# Patient Record
Sex: MALE | Race: WHITE | NOT HISPANIC OR LATINO | Employment: OTHER | URBAN - METROPOLITAN AREA
[De-identification: names, ages, dates, MRNs, and addresses within clinical notes are randomized per-mention and may not be internally consistent; named-entity substitution may affect disease eponyms.]

---

## 2018-01-05 ENCOUNTER — HOSPITAL ENCOUNTER (EMERGENCY)
Facility: HOSPITAL | Age: 28
Discharge: HOME | End: 2018-01-05
Attending: EMERGENCY MEDICINE | Admitting: EMERGENCY MEDICINE

## 2018-01-05 ENCOUNTER — APPOINTMENT (OUTPATIENT)
Dept: ULTRASOUND IMAGING | Facility: HOSPITAL | Age: 28
End: 2018-01-05

## 2018-01-05 VITALS
DIASTOLIC BLOOD PRESSURE: 79 MMHG | RESPIRATION RATE: 16 BRPM | SYSTOLIC BLOOD PRESSURE: 129 MMHG | TEMPERATURE: 97.8 F | HEART RATE: 97 BPM | WEIGHT: 165 LBS | BODY MASS INDEX: 24.44 KG/M2 | OXYGEN SATURATION: 98 % | HEIGHT: 69 IN

## 2018-01-05 DIAGNOSIS — N45.1 EPIDIDYMITIS WITH NO ABSCESS: Primary | ICD-10-CM

## 2018-01-05 LAB
ANION GAP SERPL CALCULATED.3IONS-SCNC: 9.9 MMOL/L
BACTERIA UR QL AUTO: ABNORMAL /HPF
BASOPHILS # BLD AUTO: 0.03 10*3/MM3 (ref 0–0.2)
BASOPHILS NFR BLD AUTO: 0.1 % (ref 0–1.5)
BILIRUB UR QL STRIP: NEGATIVE
BUN BLD-MCNC: 7 MG/DL (ref 6–20)
BUN/CREAT SERPL: 8.6 (ref 7–25)
CALCIUM SPEC-SCNC: 8.9 MG/DL (ref 8.6–10.5)
CHLORIDE SERPL-SCNC: 98 MMOL/L (ref 98–107)
CLARITY UR: ABNORMAL
CO2 SERPL-SCNC: 29.1 MMOL/L (ref 22–29)
COLOR UR: YELLOW
CREAT BLD-MCNC: 0.81 MG/DL (ref 0.76–1.27)
DEPRECATED RDW RBC AUTO: 43.3 FL (ref 37–54)
EOSINOPHIL # BLD AUTO: 0.13 10*3/MM3 (ref 0–0.7)
EOSINOPHIL NFR BLD AUTO: 0.6 % (ref 0.3–6.2)
ERYTHROCYTE [DISTWIDTH] IN BLOOD BY AUTOMATED COUNT: 12.7 % (ref 11.5–14.5)
GFR SERPL CREATININE-BSD FRML MDRD: 113 ML/MIN/1.73
GLUCOSE BLD-MCNC: 97 MG/DL (ref 65–99)
GLUCOSE UR STRIP-MCNC: ABNORMAL MG/DL
HCT VFR BLD AUTO: 41.7 % (ref 40.4–52.2)
HGB BLD-MCNC: 13.8 G/DL (ref 13.7–17.6)
HGB UR QL STRIP.AUTO: ABNORMAL
HYALINE CASTS UR QL AUTO: ABNORMAL /LPF
IMM GRANULOCYTES # BLD: 0.07 10*3/MM3 (ref 0–0.03)
IMM GRANULOCYTES NFR BLD: 0.3 % (ref 0–0.5)
KETONES UR QL STRIP: NEGATIVE
LEUKOCYTE ESTERASE UR QL STRIP.AUTO: ABNORMAL
LYMPHOCYTES # BLD AUTO: 2.59 10*3/MM3 (ref 0.9–4.8)
LYMPHOCYTES NFR BLD AUTO: 11.4 % (ref 19.6–45.3)
MCH RBC QN AUTO: 30.8 PG (ref 27–32.7)
MCHC RBC AUTO-ENTMCNC: 33.1 G/DL (ref 32.6–36.4)
MCV RBC AUTO: 93.1 FL (ref 79.8–96.2)
MONOCYTES # BLD AUTO: 1.59 10*3/MM3 (ref 0.2–1.2)
MONOCYTES NFR BLD AUTO: 7 % (ref 5–12)
NEUTROPHILS # BLD AUTO: 18.28 10*3/MM3 (ref 1.9–8.1)
NEUTROPHILS NFR BLD AUTO: 80.6 % (ref 42.7–76)
NITRITE UR QL STRIP: NEGATIVE
PH UR STRIP.AUTO: 6.5 [PH] (ref 5–8)
PLATELET # BLD AUTO: 284 10*3/MM3 (ref 140–500)
PMV BLD AUTO: 10.7 FL (ref 6–12)
POTASSIUM BLD-SCNC: 3.7 MMOL/L (ref 3.5–5.2)
PROT UR QL STRIP: ABNORMAL
RBC # BLD AUTO: 4.48 10*6/MM3 (ref 4.6–6)
RBC # UR: ABNORMAL /HPF
REF LAB TEST METHOD: ABNORMAL
SODIUM BLD-SCNC: 137 MMOL/L (ref 136–145)
SP GR UR STRIP: 1.03 (ref 1–1.03)
SQUAMOUS #/AREA URNS HPF: ABNORMAL /HPF
UROBILINOGEN UR QL STRIP: ABNORMAL
WBC NRBC COR # BLD: 22.69 10*3/MM3 (ref 4.5–10.7)
WBC UR QL AUTO: ABNORMAL /HPF

## 2018-01-05 PROCEDURE — 87040 BLOOD CULTURE FOR BACTERIA: CPT | Performed by: EMERGENCY MEDICINE

## 2018-01-05 PROCEDURE — 99283 EMERGENCY DEPT VISIT LOW MDM: CPT

## 2018-01-05 PROCEDURE — 25010000003 CEFTRIAXONE PER 250 MG: Performed by: EMERGENCY MEDICINE

## 2018-01-05 PROCEDURE — 25010000002 HYDROMORPHONE PER 4 MG: Performed by: EMERGENCY MEDICINE

## 2018-01-05 PROCEDURE — 76870 US EXAM SCROTUM: CPT

## 2018-01-05 PROCEDURE — 93976 VASCULAR STUDY: CPT

## 2018-01-05 PROCEDURE — 80048 BASIC METABOLIC PNL TOTAL CA: CPT | Performed by: EMERGENCY MEDICINE

## 2018-01-05 PROCEDURE — 96365 THER/PROPH/DIAG IV INF INIT: CPT

## 2018-01-05 PROCEDURE — 25010000002 ONDANSETRON PER 1 MG: Performed by: EMERGENCY MEDICINE

## 2018-01-05 PROCEDURE — 85025 COMPLETE CBC W/AUTO DIFF WBC: CPT | Performed by: EMERGENCY MEDICINE

## 2018-01-05 PROCEDURE — 87086 URINE CULTURE/COLONY COUNT: CPT | Performed by: EMERGENCY MEDICINE

## 2018-01-05 PROCEDURE — 81001 URINALYSIS AUTO W/SCOPE: CPT | Performed by: EMERGENCY MEDICINE

## 2018-01-05 PROCEDURE — 96375 TX/PRO/DX INJ NEW DRUG ADDON: CPT

## 2018-01-05 RX ORDER — OXYCODONE HYDROCHLORIDE AND ACETAMINOPHEN 5; 325 MG/1; MG/1
1 TABLET ORAL EVERY 4 HOURS PRN
Qty: 10 TABLET | Refills: 0 | Status: ON HOLD | OUTPATIENT
Start: 2018-01-05 | End: 2022-09-09

## 2018-01-05 RX ORDER — CEFTRIAXONE SODIUM 2 G/50ML
2 INJECTION, SOLUTION INTRAVENOUS ONCE
Status: COMPLETED | OUTPATIENT
Start: 2018-01-05 | End: 2018-01-05

## 2018-01-05 RX ORDER — DOXYCYCLINE 100 MG/1
100 CAPSULE ORAL EVERY 12 HOURS SCHEDULED
Qty: 20 CAPSULE | Refills: 0 | Status: ON HOLD | OUTPATIENT
Start: 2018-01-05 | End: 2022-09-09

## 2018-01-05 RX ORDER — ONDANSETRON 2 MG/ML
4 INJECTION INTRAMUSCULAR; INTRAVENOUS ONCE
Status: COMPLETED | OUTPATIENT
Start: 2018-01-05 | End: 2018-01-05

## 2018-01-05 RX ORDER — SODIUM CHLORIDE 0.9 % (FLUSH) 0.9 %
10 SYRINGE (ML) INJECTION AS NEEDED
Status: DISCONTINUED | OUTPATIENT
Start: 2018-01-05 | End: 2018-01-05 | Stop reason: HOSPADM

## 2018-01-05 RX ORDER — HYDROMORPHONE HCL 110MG/55ML
0.5 PATIENT CONTROLLED ANALGESIA SYRINGE INTRAVENOUS ONCE
Status: COMPLETED | OUTPATIENT
Start: 2018-01-05 | End: 2018-01-05

## 2018-01-05 RX ADMIN — ONDANSETRON 4 MG: 2 INJECTION INTRAMUSCULAR; INTRAVENOUS at 14:58

## 2018-01-05 RX ADMIN — HYDROMORPHONE HYDROCHLORIDE 0.5 MG: 2 INJECTION INTRAMUSCULAR; INTRAVENOUS; SUBCUTANEOUS at 14:58

## 2018-01-05 RX ADMIN — CEFTRIAXONE SODIUM 2 G: 2 INJECTION, SOLUTION INTRAVENOUS at 15:44

## 2018-01-05 NOTE — ED NOTES
Pt c/o right testicular pain and swelling that started couple days ago. Denies trauma. Reports some blood in urine. Swelling and redness present to right testicle. No penile discharge     Mariajose Pavon RN  01/05/18 7027

## 2018-01-05 NOTE — ED PROVIDER NOTES
" EMERGENCY DEPARTMENT ENCOUNTER    CHIEF COMPLAINT  Chief Complaint: right testicular pain  History given by: patient  History limited by: nothing  Room Number: 51/51  PMD: No Known Provider      HPI:  Pt is a 27 y.o. male who presents complaining of right testicular pain that began three days ago and has continue to worsen and has become constant. He also complains of pain in his abdomen and back and states that he is nauseas. Pt also began having dysuria with the pain and then hematuria.     Duration:  Three days  Onset: gradual  Timing: constant  Location: testicle  Radiation: none  Quality: \"pain\"  Intensity/Severity: moderate  Progression: worsening  Associated Symptoms: pain in his abdomen and back, nausea, dysuria, hematuria  Aggravating Factors: none  Alleviating Factors: none  Previous Episodes: none  Treatment before arrival: none    PAST MEDICAL HISTORY  Active Ambulatory Problems     Diagnosis Date Noted   • No Active Ambulatory Problems     Resolved Ambulatory Problems     Diagnosis Date Noted   • No Resolved Ambulatory Problems     No Additional Past Medical History       PAST SURGICAL HISTORY  History reviewed. No pertinent surgical history.    FAMILY HISTORY  History reviewed. No pertinent family history.    SOCIAL HISTORY  Social History     Social History   • Marital status: Single     Spouse name: N/A   • Number of children: N/A   • Years of education: N/A     Occupational History   • Not on file.     Social History Main Topics   • Smoking status: Current Every Day Smoker     Types: Electronic Cigarette   • Smokeless tobacco: Not on file   • Alcohol use Yes      Comment: socially   • Drug use: Not on file   • Sexual activity: Not on file     Other Topics Concern   • Not on file     Social History Narrative   • No narrative on file       ALLERGIES  Benadryl [diphenhydramine hcl (sleep)]    REVIEW OF SYSTEMS  Review of Systems   Constitutional: Negative for activity change, appetite change and " fever.   HENT: Negative for congestion and sore throat.    Eyes: Negative.    Respiratory: Negative for cough and shortness of breath.    Cardiovascular: Negative for chest pain and leg swelling.   Gastrointestinal: Positive for abdominal pain and nausea. Negative for diarrhea and vomiting.   Endocrine: Negative.    Genitourinary: Positive for dysuria, hematuria and testicular pain (right). Negative for decreased urine volume.   Musculoskeletal: Positive for back pain. Negative for neck pain.   Skin: Negative for rash and wound.   Allergic/Immunologic: Negative.    Neurological: Negative for weakness, numbness and headaches.   Hematological: Negative.    Psychiatric/Behavioral: Negative.    All other systems reviewed and are negative.      PHYSICAL EXAM  ED Triage Vitals   Temp Heart Rate Resp BP SpO2   01/05/18 1220 01/05/18 1220 01/05/18 1220 01/05/18 1305 01/05/18 1220   97.8 °F (36.6 °C) 107 16 133/84 100 %      Temp src Heart Rate Source Patient Position BP Location FiO2 (%)   01/05/18 1220 -- -- -- --   Tympanic           Physical Exam   Constitutional: He is oriented to person, place, and time and well-developed, well-nourished, and in no distress.   HENT:   Head: Normocephalic and atraumatic.   Eyes: EOM are normal. Pupils are equal, round, and reactive to light.   Neck: Normal range of motion. Neck supple.   Cardiovascular: Normal rate, regular rhythm and normal heart sounds.    Pulmonary/Chest: Effort normal and breath sounds normal. No respiratory distress.   Abdominal: Soft. There is no tenderness. There is no rebound and no guarding.   Genitourinary: He exhibits testicular tenderness, scrotal tenderness and epididymal tenderness. No discharge found.   Genitourinary Comments: Pt's right testicle is swollen and tendernesss to palpation that extends into his inguinal canal. No hernia felt.   Musculoskeletal: Normal range of motion. He exhibits no edema.   Neurological: He is alert and oriented to person,  place, and time. He has normal sensation and normal strength.   Skin: Skin is warm and dry.   Psychiatric: Mood and affect normal.   Nursing note and vitals reviewed.      LAB RESULTS  Lab Results (last 24 hours)     Procedure Component Value Units Date/Time    CBC & Differential [[id number]] Collected:  01/05/18 1458    Specimen:  Blood Updated:  01/05/18 1511    Narrative:       The following orders were created for panel order CBC & Differential.  Procedure                               Abnormality         Status                     ---------                               -----------         ------                     CBC Auto Differential[[id number]]        Abnormal            Final result                 Please view results for these tests on the individual orders.    Basic Metabolic Panel [[id number]]  (Abnormal) Collected:  01/05/18 1458    Specimen:  Blood Updated:  01/05/18 1532     Glucose 97 mg/dL      BUN 7 mg/dL      Creatinine 0.81 mg/dL      Sodium 137 mmol/L      Potassium 3.7 mmol/L      Chloride 98 mmol/L      CO2 29.1 (H) mmol/L      Calcium 8.9 mg/dL      eGFR Non African Amer 113 mL/min/1.73      BUN/Creatinine Ratio 8.6     Anion Gap 9.9 mmol/L     Narrative:       GFR Normal >60  Chronic Kidney Disease <60  Kidney Failure <15    Urinalysis With / Culture If Indicated - Urine, Clean Catch [[id number]]  (Abnormal) Collected:  01/05/18 1458    Specimen:  Urine from Urine, Clean Catch Updated:  01/05/18 1515     Color, UA Yellow     Appearance, UA Cloudy (A)     pH, UA 6.5     Specific Gravity, UA 1.027     Glucose,  mg/dL (1+) (A)     Ketones, UA Negative     Bilirubin, UA Negative     Blood, UA Moderate (2+) (A)     Protein, UA 30 mg/dL (1+) (A)     Leuk Esterase, UA Moderate (2+) (A)     Nitrite, UA Negative     Urobilinogen, UA 1.0 E.U./dL    Chlamydia trachomatis, Neisseria gonorrhoeae, PCR - Urine, Urine, Clean Catch [[id number]] Collected:  01/05/18 1458    Specimen:  Urine from Urine,  Clean Catch Updated:  01/05/18 1506    CBC Auto Differential [[id number]]  (Abnormal) Collected:  01/05/18 1458    Specimen:  Blood Updated:  01/05/18 1511     WBC 22.69 (H) 10*3/mm3      RBC 4.48 (L) 10*6/mm3      Hemoglobin 13.8 g/dL      Hematocrit 41.7 %      MCV 93.1 fL      MCH 30.8 pg      MCHC 33.1 g/dL      RDW 12.7 %      RDW-SD 43.3 fl      MPV 10.7 fL      Platelets 284 10*3/mm3      Neutrophil % 80.6 (H) %      Lymphocyte % 11.4 (L) %      Monocyte % 7.0 %      Eosinophil % 0.6 %      Basophil % 0.1 %      Immature Grans % 0.3 %      Neutrophils, Absolute 18.28 (H) 10*3/mm3      Lymphocytes, Absolute 2.59 10*3/mm3      Monocytes, Absolute 1.59 (H) 10*3/mm3      Eosinophils, Absolute 0.13 10*3/mm3      Basophils, Absolute 0.03 10*3/mm3      Immature Grans, Absolute 0.07 (H) 10*3/mm3     Urinalysis, Microscopic Only - Urine, Clean Catch [[id number]]  (Abnormal) Collected:  01/05/18 1458    Specimen:  Urine from Urine, Clean Catch Updated:  01/05/18 1515     RBC, UA 21-30 (A) /HPF      WBC, UA Too Numerous to Count (A) /HPF      Bacteria, UA None Seen /HPF      Squamous Epithelial Cells, UA 0-2 /HPF      Hyaline Casts, UA 0-2 /LPF      Methodology Automated Microscopy    Urine Culture - Urine, Urine, Clean Catch [[id number]] Collected:  01/05/18 1458    Specimen:  Urine from Urine, Clean Catch Updated:  01/05/18 1512    Blood Culture - Blood, [[id number]] Collected:  01/05/18 1537    Specimen:  Blood from Arm, Left Updated:  01/05/18 1543    Blood Culture - Blood, [[id number]] Collected:  01/05/18 1537    Specimen:  Blood from Arm, Left Updated:  01/05/18 1543          I ordered the above labs and reviewed the results    RADIOLOGY  US Testicular or Ovarian Vascular Limited   Final Result   Hyperemia and enlargement of the right epididymis consistent   with epididymitis. No evidence for abscess or drainable collection or   orchitis. Recommend follow-up exam if patient symptoms persist or   worsen.       This  report was finalized on 1/5/2018 2:32 PM by Dr. Viral Sepulveda MD.          US Scrotum & Testicles    (Results Pending)        I ordered the above noted radiological studies. Interpreted by radiologist. Reviewed by me in PACS.       PROCEDURES  Procedures      PROGRESS AND CONSULTS  ED Course   1307  Ordered testicular US for further evaluation.     1443  Ordered Dilaudid for pain, Zofran for nausea. Ordered labs and UA for further evaluation.     1637  Informed Pt that he has a severe UTI. I offered Pt admission for abx therapy and he declines. Discussed plans to discharge Pt with abx and instructed him to follow up with urologist in two days. Instructed Pt to return to the ED if he develops a fever, begins to vomit, or his pain increases. Pt understands and agrees to all plans. All questions answered.         MEDICAL DECISION MAKING  Results were reviewed/discussed with the patient and they were also made aware of online access. Pt also made aware that some labs, such as cultures, will not be resulted during ER visit and follow up with PMD is necessary.     MDM  Number of Diagnoses or Management Options  Epididymitis with no abscess:      Amount and/or Complexity of Data Reviewed  Clinical lab tests: ordered and reviewed (RBC, UA= 21-30, WBC UA=too numerous to count. WBC=22.69, Blood UA=2+)  Tests in the radiology section of CPT®: ordered and reviewed (Testicular US shows hyperemia and enlargement of the right epididymis consistent with epididymitis. There is no evidence for abscess or drainable collection or orchitis. )           DIAGNOSIS  Final diagnoses:   Epididymitis with no abscess       DISPOSITION  DISCHARGE    Patient discharged in stable condition.    Reviewed implications of results, diagnosis, meds, responsibility to follow up, warning signs and symptoms of possible worsening, potential complications and reasons to return to ER, including fever, vomiting, or increased pain.    Patient/Family  voiced understanding of above instructions.    Discussed plan for discharge, as there is no emergent indication for admission.  Pt/family is agreeable and understands need for follow up and repeat testing.  Pt is aware that discharge does not mean that nothing is wrong but it indicates no emergency is present that requires admission and they must continue care with follow-up as given below or physician of their choice.     FOLLOW-UP  Vignesh Tomas MD  [address] [place], [place] [id number]  [phone]    Schedule an appointment as soon as possible for a visit  follow up Monday    [facility]YSICAN [facility]FERRAL [facility]  [facility] [address]  [phone]  Schedule an appointment as soon as possible for a visit  medical care         Medication List      New Prescriptions          doxycycline 100 MG capsule   Commonly known as:  MONODOX   Take 1 capsule by mouth Every 12 (Twelve) Hours.       oxyCODONE-acetaminophen 5-325 MG per tablet   Commonly known as:  PERCOCET   Take 1 tablet by mouth Every 4 (Four) Hours As Needed for Severe Pain .               Latest Documented Vital Signs:  As of 4:55 PM  BP- 129/79 HR- 97 Temp- 97.8 °F (36.6 °C) (Tympanic) O2 sat- 98%    --  Documentation assistance provided by alexander Jennings for Dr. Lozano.  Information recorded by the scribe was done at my direction and has been verified and validated by me.       Dariana Jennings  01/05/18 6101       Valeriy Lozano MD  01/05/18 9533

## 2018-01-07 LAB — BACTERIA SPEC AEROBE CULT: NO GROWTH

## 2018-01-10 ENCOUNTER — TRANSCRIBE ORDERS (OUTPATIENT)
Dept: LAB | Facility: HOSPITAL | Age: 28
End: 2018-01-10

## 2018-01-10 ENCOUNTER — LAB (OUTPATIENT)
Dept: LAB | Facility: HOSPITAL | Age: 28
End: 2018-01-10
Attending: EMERGENCY MEDICINE

## 2018-01-10 DIAGNOSIS — R78.81 POSITIVE BLOOD CULTURE: Primary | ICD-10-CM

## 2018-01-10 DIAGNOSIS — R78.81 POSITIVE BLOOD CULTURE: ICD-10-CM

## 2018-01-10 LAB
BACTERIA SPEC AEROBE CULT: ABNORMAL
BACTERIA SPEC AEROBE CULT: ABNORMAL
BACTERIA SPEC AEROBE CULT: NORMAL
GRAM STN SPEC: ABNORMAL
ISOLATED FROM: ABNORMAL

## 2018-01-10 PROCEDURE — 87040 BLOOD CULTURE FOR BACTERIA: CPT

## 2018-01-11 NOTE — PROGRESS NOTES
Mr. Odom's blood culture resulted today as positive for Clostridium difficile. Discussed with NP and MD from the emergency department as well as our infectious disease pharmacist and physician. The clinical picture the patient presented with during ED visit (epididymitis) does not match this result and patient's 2nd blood culture draw during same visit was negative.    Based on recommendation from ID physician, patient was called and asked to return to the ER. He stated he was feeling fine and we are planning to register him as an outpatient and obtain two sets of repeat blood cultures based on provider order. If patient returns and these cultures are obtained we will follow them up daily.     Called and discussed these plans with patient around 4pm. Patient stated he would return to ED within a couple hours.     Jami Spears, PharmD, [facility]  Clinical Pharmacy Specialist, Emergency Medicine   Phone: [phone]

## 2018-01-15 LAB — BACTERIA SPEC AEROBE CULT: NORMAL

## 2022-09-09 ENCOUNTER — APPOINTMENT (OUTPATIENT)
Dept: CT IMAGING | Facility: HOSPITAL | Age: 32
End: 2022-09-09

## 2022-09-09 ENCOUNTER — APPOINTMENT (OUTPATIENT)
Dept: ULTRASOUND IMAGING | Facility: HOSPITAL | Age: 32
End: 2022-09-09

## 2022-09-09 ENCOUNTER — HOSPITAL ENCOUNTER (OUTPATIENT)
Facility: HOSPITAL | Age: 32
Setting detail: OBSERVATION
Discharge: HOME | End: 2022-09-10
Attending: EMERGENCY MEDICINE | Admitting: EMERGENCY MEDICINE

## 2022-09-09 DIAGNOSIS — R10.31 RIGHT LOWER QUADRANT ABDOMINAL PAIN: Primary | ICD-10-CM

## 2022-09-09 DIAGNOSIS — R93.5 ABNORMAL CT OF THE ABDOMEN: ICD-10-CM

## 2022-09-09 PROBLEM — R10.9 ABDOMINAL PAIN: Status: ACTIVE | Noted: 2022-09-09

## 2022-09-09 LAB
ALBUMIN SERPL-MCNC: 4.4 G/DL (ref 3.5–5.2)
ALBUMIN/GLOB SERPL: 1.4 G/DL
ALP SERPL-CCNC: 123 U/L (ref 39–117)
ALT SERPL W P-5'-P-CCNC: 20 U/L (ref 1–41)
ANION GAP SERPL CALCULATED.3IONS-SCNC: 8.1 MMOL/L (ref 5–15)
AST SERPL-CCNC: 21 U/L (ref 1–40)
BASOPHILS # BLD AUTO: 0.06 10*3/MM3 (ref 0–0.2)
BASOPHILS NFR BLD AUTO: 0.7 % (ref 0–1.5)
BILIRUB SERPL-MCNC: 0.3 MG/DL (ref 0–1.2)
BILIRUB UR QL STRIP: NEGATIVE
BUN SERPL-MCNC: 8 MG/DL (ref 6–20)
BUN/CREAT SERPL: 9.1 (ref 7–25)
CALCIUM SPEC-SCNC: 9.5 MG/DL (ref 8.6–10.5)
CHLORIDE SERPL-SCNC: 100 MMOL/L (ref 98–107)
CLARITY UR: CLEAR
CO2 SERPL-SCNC: 28.9 MMOL/L (ref 22–29)
COLOR UR: YELLOW
CREAT SERPL-MCNC: 0.88 MG/DL (ref 0.76–1.27)
DEPRECATED RDW RBC AUTO: 41.3 FL (ref 37–54)
EGFRCR SERPLBLD CKD-EPI 2021: 115.7 ML/MIN/1.73
EOSINOPHIL # BLD AUTO: 0.14 10*3/MM3 (ref 0–0.4)
EOSINOPHIL NFR BLD AUTO: 1.5 % (ref 0.3–6.2)
ERYTHROCYTE [DISTWIDTH] IN BLOOD BY AUTOMATED COUNT: 12.4 % (ref 12.3–15.4)
GLOBULIN UR ELPH-MCNC: 3.2 GM/DL
GLUCOSE SERPL-MCNC: 98 MG/DL (ref 65–99)
GLUCOSE UR STRIP-MCNC: NEGATIVE MG/DL
HCT VFR BLD AUTO: 43.5 % (ref 37.5–51)
HGB BLD-MCNC: 14.2 G/DL (ref 13–17.7)
HGB UR QL STRIP.AUTO: NEGATIVE
IMM GRANULOCYTES # BLD AUTO: 0.03 10*3/MM3 (ref 0–0.05)
IMM GRANULOCYTES NFR BLD AUTO: 0.3 % (ref 0–0.5)
KETONES UR QL STRIP: NEGATIVE
LEUKOCYTE ESTERASE UR QL STRIP.AUTO: NEGATIVE
LIPASE SERPL-CCNC: 54 U/L (ref 13–60)
LYMPHOCYTES # BLD AUTO: 3.02 10*3/MM3 (ref 0.7–3.1)
LYMPHOCYTES NFR BLD AUTO: 32.9 % (ref 19.6–45.3)
MCH RBC QN AUTO: 29.5 PG (ref 26.6–33)
MCHC RBC AUTO-ENTMCNC: 32.6 G/DL (ref 31.5–35.7)
MCV RBC AUTO: 90.4 FL (ref 79–97)
MONOCYTES # BLD AUTO: 0.65 10*3/MM3 (ref 0.1–0.9)
MONOCYTES NFR BLD AUTO: 7.1 % (ref 5–12)
NEUTROPHILS NFR BLD AUTO: 5.27 10*3/MM3 (ref 1.7–7)
NEUTROPHILS NFR BLD AUTO: 57.5 % (ref 42.7–76)
NITRITE UR QL STRIP: NEGATIVE
NRBC BLD AUTO-RTO: 0 /100 WBC (ref 0–0.2)
PH UR STRIP.AUTO: 6.5 [PH] (ref 5–8)
PLATELET # BLD AUTO: 351 10*3/MM3 (ref 140–450)
PMV BLD AUTO: 10.5 FL (ref 6–12)
POTASSIUM SERPL-SCNC: 3.5 MMOL/L (ref 3.5–5.2)
PROT SERPL-MCNC: 7.6 G/DL (ref 6–8.5)
PROT UR QL STRIP: NEGATIVE
RBC # BLD AUTO: 4.81 10*6/MM3 (ref 4.14–5.8)
SODIUM SERPL-SCNC: 137 MMOL/L (ref 136–145)
SP GR UR STRIP: 1.01 (ref 1–1.03)
UROBILINOGEN UR QL STRIP: NORMAL
WBC NRBC COR # BLD: 9.17 10*3/MM3 (ref 3.4–10.8)

## 2022-09-09 PROCEDURE — 85025 COMPLETE CBC W/AUTO DIFF WBC: CPT | Performed by: PHYSICIAN ASSISTANT

## 2022-09-09 PROCEDURE — 74177 CT ABD & PELVIS W/CONTRAST: CPT

## 2022-09-09 PROCEDURE — 25010000002 ONDANSETRON PER 1 MG: Performed by: EMERGENCY MEDICINE

## 2022-09-09 PROCEDURE — 25010000002 MORPHINE PER 10 MG: Performed by: EMERGENCY MEDICINE

## 2022-09-09 PROCEDURE — 83690 ASSAY OF LIPASE: CPT | Performed by: PHYSICIAN ASSISTANT

## 2022-09-09 PROCEDURE — 25010000002 IOPAMIDOL 61 % SOLUTION: Performed by: EMERGENCY MEDICINE

## 2022-09-09 PROCEDURE — 76705 ECHO EXAM OF ABDOMEN: CPT

## 2022-09-09 PROCEDURE — 99284 EMERGENCY DEPT VISIT MOD MDM: CPT

## 2022-09-09 PROCEDURE — G0378 HOSPITAL OBSERVATION PER HR: HCPCS

## 2022-09-09 PROCEDURE — 80053 COMPREHEN METABOLIC PANEL: CPT | Performed by: PHYSICIAN ASSISTANT

## 2022-09-09 PROCEDURE — 36415 COLL VENOUS BLD VENIPUNCTURE: CPT

## 2022-09-09 PROCEDURE — 99203 OFFICE O/P NEW LOW 30 MIN: CPT | Performed by: SURGERY

## 2022-09-09 PROCEDURE — 96374 THER/PROPH/DIAG INJ IV PUSH: CPT

## 2022-09-09 PROCEDURE — 81003 URINALYSIS AUTO W/O SCOPE: CPT | Performed by: PHYSICIAN ASSISTANT

## 2022-09-09 PROCEDURE — 96375 TX/PRO/DX INJ NEW DRUG ADDON: CPT

## 2022-09-09 RX ORDER — CIPROFLOXACIN 500 MG/1
500 TABLET, FILM COATED ORAL 2 TIMES DAILY
COMMUNITY
End: 2022-09-10 | Stop reason: HOSPADM

## 2022-09-09 RX ORDER — SODIUM CHLORIDE, SODIUM LACTATE, POTASSIUM CHLORIDE, CALCIUM CHLORIDE 600; 310; 30; 20 MG/100ML; MG/100ML; MG/100ML; MG/100ML
100 INJECTION, SOLUTION INTRAVENOUS CONTINUOUS
Status: DISCONTINUED | OUTPATIENT
Start: 2022-09-09 | End: 2022-09-10 | Stop reason: HOSPADM

## 2022-09-09 RX ORDER — NALOXONE HCL 0.4 MG/ML
0.4 VIAL (ML) INJECTION
Status: DISCONTINUED | OUTPATIENT
Start: 2022-09-09 | End: 2022-09-09

## 2022-09-09 RX ORDER — HYDROMORPHONE HYDROCHLORIDE 1 MG/ML
0.5 INJECTION, SOLUTION INTRAMUSCULAR; INTRAVENOUS; SUBCUTANEOUS
Status: DISCONTINUED | OUTPATIENT
Start: 2022-09-09 | End: 2022-09-09

## 2022-09-09 RX ORDER — KETOROLAC TROMETHAMINE 30 MG/ML
30 INJECTION, SOLUTION INTRAMUSCULAR; INTRAVENOUS EVERY 6 HOURS PRN
Status: DISCONTINUED | OUTPATIENT
Start: 2022-09-09 | End: 2022-09-10 | Stop reason: HOSPADM

## 2022-09-09 RX ORDER — ONDANSETRON 4 MG/1
4 TABLET, FILM COATED ORAL EVERY 6 HOURS PRN
Status: DISCONTINUED | OUTPATIENT
Start: 2022-09-09 | End: 2022-09-10 | Stop reason: HOSPADM

## 2022-09-09 RX ORDER — SODIUM CHLORIDE 0.9 % (FLUSH) 0.9 %
10 SYRINGE (ML) INJECTION AS NEEDED
Status: DISCONTINUED | OUTPATIENT
Start: 2022-09-09 | End: 2022-09-10 | Stop reason: HOSPADM

## 2022-09-09 RX ORDER — MORPHINE SULFATE 2 MG/ML
4 INJECTION, SOLUTION INTRAMUSCULAR; INTRAVENOUS ONCE
Status: COMPLETED | OUTPATIENT
Start: 2022-09-09 | End: 2022-09-09

## 2022-09-09 RX ORDER — ONDANSETRON 2 MG/ML
4 INJECTION INTRAMUSCULAR; INTRAVENOUS ONCE
Status: COMPLETED | OUTPATIENT
Start: 2022-09-09 | End: 2022-09-09

## 2022-09-09 RX ORDER — CHOLECALCIFEROL (VITAMIN D3) 125 MCG
5 CAPSULE ORAL NIGHTLY PRN
Status: DISCONTINUED | OUTPATIENT
Start: 2022-09-09 | End: 2022-09-10 | Stop reason: HOSPADM

## 2022-09-09 RX ORDER — NITROGLYCERIN 0.4 MG/1
0.4 TABLET SUBLINGUAL
Status: DISCONTINUED | OUTPATIENT
Start: 2022-09-09 | End: 2022-09-10 | Stop reason: HOSPADM

## 2022-09-09 RX ORDER — ONDANSETRON 2 MG/ML
4 INJECTION INTRAMUSCULAR; INTRAVENOUS EVERY 6 HOURS PRN
Status: DISCONTINUED | OUTPATIENT
Start: 2022-09-09 | End: 2022-09-10 | Stop reason: HOSPADM

## 2022-09-09 RX ORDER — ACETAMINOPHEN 325 MG/1
650 TABLET ORAL EVERY 4 HOURS PRN
Status: DISCONTINUED | OUTPATIENT
Start: 2022-09-09 | End: 2022-09-10 | Stop reason: HOSPADM

## 2022-09-09 RX ORDER — SODIUM CHLORIDE 0.9 % (FLUSH) 0.9 %
10 SYRINGE (ML) INJECTION EVERY 12 HOURS SCHEDULED
Status: DISCONTINUED | OUTPATIENT
Start: 2022-09-09 | End: 2022-09-10 | Stop reason: HOSPADM

## 2022-09-09 RX ORDER — PANTOPRAZOLE SODIUM 40 MG/1
40 TABLET, DELAYED RELEASE ORAL
Status: DISCONTINUED | OUTPATIENT
Start: 2022-09-09 | End: 2022-09-10 | Stop reason: HOSPADM

## 2022-09-09 RX ADMIN — MORPHINE SULFATE 4 MG: 2 INJECTION, SOLUTION INTRAMUSCULAR; INTRAVENOUS at 06:47

## 2022-09-09 RX ADMIN — PANTOPRAZOLE SODIUM 40 MG: 40 TABLET, DELAYED RELEASE ORAL at 21:29

## 2022-09-09 RX ADMIN — Medication 10 ML: at 06:45

## 2022-09-09 RX ADMIN — Medication 10 ML: at 12:12

## 2022-09-09 RX ADMIN — ONDANSETRON 4 MG: 2 INJECTION INTRAMUSCULAR; INTRAVENOUS at 06:46

## 2022-09-09 RX ADMIN — SODIUM CHLORIDE 1000 ML: 9 INJECTION, SOLUTION INTRAVENOUS at 06:45

## 2022-09-09 RX ADMIN — SODIUM CHLORIDE, POTASSIUM CHLORIDE, SODIUM LACTATE AND CALCIUM CHLORIDE 100 ML/HR: 600; 310; 30; 20 INJECTION, SOLUTION INTRAVENOUS at 12:12

## 2022-09-09 RX ADMIN — IOPAMIDOL 95 ML: 612 INJECTION, SOLUTION INTRAVENOUS at 08:21

## 2022-09-09 RX ADMIN — Medication 10 ML: at 21:29

## 2022-09-09 NOTE — ED PROVIDER NOTES
EMERGENCY DEPARTMENT ENCOUNTER    Room Number:  07/07  Date of encounter:  9/9/2022  PCP: Provider, No Known  Historian: Patient      I used full protective equipment while examining this patient.  This includes face mask, gloves and protective eyewear.  I washed my hands before entering the room and immediately upon leaving the room      HPI:  Chief Complaint: Abdominal pain  A complete HPI/ROS/PMH/PSH/SH/FH are unobtainable due to: Nothing    Context: Adam Odom is a 32 y.o. male who presents to the ED c/o 3-day history of gradual onset right lower quadrant abdominal pain.  Patient describes an achy, crampy sensation in right lower quadrant.  He denies any radiation of pain.  There are no precipitating or alleviating factors.  He has had associated nausea but denies any fever, vomiting, diarrhea, dysuria.  He denies any prior abdominal surgeries.    Review of Medical Records  I reviewed last ER visit from 1/5/2018.  Patient seen for epididymitis.    PAST MEDICAL HISTORY  Active Ambulatory Problems     Diagnosis Date Noted   • No Active Ambulatory Problems     Resolved Ambulatory Problems     Diagnosis Date Noted   • No Resolved Ambulatory Problems     No Additional Past Medical History         PAST SURGICAL HISTORY  No past surgical history on file.      FAMILY HISTORY  No family history on file.      SOCIAL HISTORY  Social History     Socioeconomic History   • Marital status: Single   Tobacco Use   • Smoking status: Current Every Day Smoker     Types: Electronic Cigarette   Substance and Sexual Activity   • Alcohol use: Yes     Comment: socially         ALLERGIES  Benadryl [diphenhydramine hcl (sleep)]        REVIEW OF SYSTEMS  All systems reviewed and negative except for those discussed in HPI.       PHYSICAL EXAM    I have reviewed the triage vital signs and nursing notes.    ED Triage Vitals [09/09/22 0524]   Temp Heart Rate Resp BP SpO2   98.2 °F (36.8 °C) 76 16 (!) 140/113 99 %      Temp src Heart Rate  Source Patient Position BP Location FiO2 (%)   Oral Monitor Sitting Right arm --       Physical Exam  GENERAL: Alert, oriented, nontoxic-appearing, not distressed  HENT: head atraumatic, no nuchal rigidity  EYES: no scleral icterus, EOMI  CV: regular rhythm, regular rate, no murmur  RESPIRATORY: normal effort, CTA  ABDOMEN: soft, mild right lower quadrant and periumbilical abdominal tenderness without guarding or rebound.  Normal bowel sounds.  MUSCULOSKELETAL: no deformity, FROM, no calf swelling or tenderness  NEURO: alert, moves all extremities, follows commands  SKIN: warm, dry        LAB RESULTS  Recent Results (from the past 24 hour(s))   Comprehensive Metabolic Panel    Collection Time: 09/09/22  6:44 AM    Specimen: Blood   Result Value Ref Range    Glucose 98 65 - 99 mg/dL    BUN 8 6 - 20 mg/dL    Creatinine 0.88 0.76 - 1.27 mg/dL    Sodium 137 136 - 145 mmol/L    Potassium 3.5 3.5 - 5.2 mmol/L    Chloride 100 98 - 107 mmol/L    CO2 28.9 22.0 - 29.0 mmol/L    Calcium 9.5 8.6 - 10.5 mg/dL    Total Protein 7.6 6.0 - 8.5 g/dL    Albumin 4.40 3.50 - 5.20 g/dL    ALT (SGPT) 20 1 - 41 U/L    AST (SGOT) 21 1 - 40 U/L    Alkaline Phosphatase 123 (H) 39 - 117 U/L    Total Bilirubin 0.3 0.0 - 1.2 mg/dL    Globulin 3.2 gm/dL    A/G Ratio 1.4 g/dL    BUN/Creatinine Ratio 9.1 7.0 - 25.0    Anion Gap 8.1 5.0 - 15.0 mmol/L    eGFR 115.7 >60.0 mL/min/1.73   Lipase    Collection Time: 09/09/22  6:44 AM    Specimen: Blood   Result Value Ref Range    Lipase 54 13 - 60 U/L   CBC Auto Differential    Collection Time: 09/09/22  6:44 AM    Specimen: Blood   Result Value Ref Range    WBC 9.17 3.40 - 10.80 10*3/mm3    RBC 4.81 4.14 - 5.80 10*6/mm3    Hemoglobin 14.2 13.0 - 17.7 g/dL    Hematocrit 43.5 37.5 - 51.0 %    MCV 90.4 79.0 - 97.0 fL    MCH 29.5 26.6 - 33.0 pg    MCHC 32.6 31.5 - 35.7 g/dL    RDW 12.4 12.3 - 15.4 %    RDW-SD 41.3 37.0 - 54.0 fl    MPV 10.5 6.0 - 12.0 fL    Platelets 351 140 - 450 10*3/mm3    Neutrophil %  57.5 42.7 - 76.0 %    Lymphocyte % 32.9 19.6 - 45.3 %    Monocyte % 7.1 5.0 - 12.0 %    Eosinophil % 1.5 0.3 - 6.2 %    Basophil % 0.7 0.0 - 1.5 %    Immature Grans % 0.3 0.0 - 0.5 %    Neutrophils, Absolute 5.27 1.70 - 7.00 10*3/mm3    Lymphocytes, Absolute 3.02 0.70 - 3.10 10*3/mm3    Monocytes, Absolute 0.65 0.10 - 0.90 10*3/mm3    Eosinophils, Absolute 0.14 0.00 - 0.40 10*3/mm3    Basophils, Absolute 0.06 0.00 - 0.20 10*3/mm3    Immature Grans, Absolute 0.03 0.00 - 0.05 10*3/mm3    nRBC 0.0 0.0 - 0.2 /100 WBC   Urinalysis With Microscopic If Indicated (No Culture) - Urine, Clean Catch    Collection Time: 09/09/22  8:49 AM    Specimen: Urine, Clean Catch   Result Value Ref Range    Color, UA Yellow Yellow, Straw    Appearance, UA Clear Clear    pH, UA 6.5 5.0 - 8.0    Specific Gravity, UA 1.011 1.005 - 1.030    Glucose, UA Negative Negative    Ketones, UA Negative Negative    Bilirubin, UA Negative Negative    Blood, UA Negative Negative    Protein, UA Negative Negative    Leuk Esterase, UA Negative Negative    Nitrite, UA Negative Negative    Urobilinogen, UA 0.2 E.U./dL 0.2 - 1.0 E.U./dL       Ordered the above labs and independently reviewed the results.        RADIOLOGY  CT Abdomen Pelvis With Contrast    Result Date: 9/9/2022  CT ABDOMEN AND PELVIS WITH IV CONTRAST  HISTORY: 32-year-old male with right lower quadrant pain.  TECHNIQUE: Radiation dose reduction techniques were utilized, including automated exposure control and exposure modulation based on body size. 3 mm images were obtained through the abdomen and pelvis after the administration of IV contrast. There is no previous CT for comparison.  FINDINGS: The liver, gallbladder, spleen, pancreas, adrenals, and both kidneys appear unremarkable. The gastric antrum appears thickened and there is mild surrounding haziness, image 53. There is marginal thickening of the adjacent aspect of the transverse colon. The appendix measures 6-7 mm in diameter, image  70. There is no surrounding inflammatory change or fluid. There is no free fluid or lymphadenopathy.      1. There is a thickened appearance of the gastric antrum. Please correlate clinically for gastritis. 2. There is a portion of the appendix which is marginally thickened with a transverse diameter of 7 mm, upper limits of normal is 6 mm. There is no surrounding inflammatory change or convincing evidence for acute appendicitis, but very mild or early appendicitis cannot be excluded.  Discussed with VIANY Hendricks.        I ordered the above noted radiological studies. Reviewed by me and discussed with radiologist.  See dictation for official radiology interpretation.      MEDICATIONS GIVEN IN ER    Medications   sodium chloride 0.9 % flush 10 mL (10 mL Intravenous Given 9/9/22 0645)   morphine injection 4 mg (4 mg Intravenous Given 9/9/22 0647)   ondansetron (ZOFRAN) injection 4 mg (4 mg Intravenous Given 9/9/22 0646)   sodium chloride 0.9 % bolus 1,000 mL (0 mL Intravenous Stopped 9/9/22 0831)   iopamidol (ISOVUE-300) 61 % injection 100 mL (95 mL Intravenous Given 9/9/22 0821)         PROGRESS, DATA ANALYSIS, CONSULTS, AND MEDICAL DECISION MAKING    All labs have been independently reviewed by me.  All radiology studies have been reviewed by me and discussed with radiologist dictating the report.   EKG's independently viewed and interpreted by me.  Discussion below represents my analysis of pertinent findings related to patient's condition, differential diagnosis, treatment plan and final disposition.    I have discussed case with Dr. Ramsey, emergency room physician.  He has performed his own bedside examination and agrees with treatment plan.    ED Course as of 09/09/22 0944   Fri Sep 09, 2022   0620 Patient presents with 3-day history of right lower quadrant abdominal pain.  Differential diagnoses include not limited to ureterolithiasis, appendicitis, colitis, diverticulitis. [EE]   0900 I discussed CT  findings with Dr. Holt.  Patient does have a thickened gastric antrum, consistent with possible ulcer.  Patient's appendix is slightly enlarged.  No evidence of surrounding erythema or abscess. [EE]   0912 WBC: 9.17 [EE]   0912 Creatinine: 0.88 [EE]   0912 Lipase: 54 [EE]   0931 I discussed case with Dr. Mane, general surgery.  He would like to place the patient in observation to monitor him.    I discussed this with Saumya, physician assistant in the observation unit.  She agrees to admit him. [EE]   0944 I updated the patient on these plans.  He is in agreement. [EE]      ED Course User Index  [EE] Jose Jefferson PA       AS OF 09:44 EDT VITALS:    BP - 129/83  HR - 64  TEMP - 98.2 °F (36.8 °C) (Oral)  O2 SATS - 99%        DIAGNOSIS  Final diagnoses:   Right lower quadrant abdominal pain   Abnormal CT of the abdomen         DISPOSITION  Admitted      Dictated utilizing Dragon dictation     Jose Jefferson PA  09/09/22 0975

## 2022-09-09 NOTE — ED NOTES
Nursing report ED to floor  Adam Odom  32 y.o.  male    HPI :   Chief Complaint   Patient presents with   • Abdominal Pain       Admitting doctor:   Roshan Martinez MD    Admitting diagnosis:   The primary encounter diagnosis was Right lower quadrant abdominal pain. A diagnosis of Abnormal CT of the abdomen was also pertinent to this visit.    Code status:   Current Code Status     Date Active Code Status Order ID Comments User Context       9/9/2022 0933 CPR (Attempt to Resuscitate) [id number]  Saumya Singh PA ED     Advance Care Planning Activity      Questions for Current Code Status     Question Answer    Code Status (Patient has no pulse and is not breathing) CPR (Attempt to Resuscitate)    Medical Interventions (Patient has pulse or is breathing) Full Support    Level Of Support Discussed With Patient          Allergies:   Benadryl [diphenhydramine hcl (sleep)]    Intake and Output  No intake or output data in the 24 hours ending 09/09/22 0950    Weight:       09/09/22 0524   Weight: 79.4 kg (175 lb)       Most recent vitals:   Vitals:    09/09/22 0701 09/09/22 0801 09/09/22 0831 09/09/22 0931   BP: 138/92 146/93 136/95 129/83   BP Location:       Patient Position:       Pulse: 66 60 69 64   Resp: 18 18 18 18   Temp:       TempSrc:       SpO2: 98% 100% 98% 99%   Weight:       Height:           Active LDAs/IV Access:   Lines, Drains & Airways     Active LDAs     Name Placement date Placement time Site Days    Peripheral IV 09/09/22 0559 Left Antecubital 09/09/22 0559  Antecubital  less than 1                Labs (abnormal labs have a star):   Labs Reviewed   COMPREHENSIVE METABOLIC PANEL - Abnormal; Notable for the following components:       Result Value    Alkaline Phosphatase 123 (*)     All other components within normal limits    Narrative:     GFR Normal >60  Chronic Kidney Disease <60  Kidney Failure <15     LIPASE - Normal   URINALYSIS W/ MICROSCOPIC IF INDICATED (NO CULTURE) - Normal     Narrative:     Urine microscopic not indicated.   CBC WITH AUTO DIFFERENTIAL - Normal   CBC AND DIFFERENTIAL    Narrative:     The following orders were created for panel order CBC & Differential.  Procedure                               Abnormality         Status                     ---------                               -----------         ------                     CBC Auto Differential[[id number]]        Normal              Final result                 Please view results for these tests on the individual orders.       EKG:   No orders to display       Meds given in ED:   Medications   sodium chloride 0.9 % flush 10 mL (10 mL Intravenous Given 9/9/22 0645)   morphine injection 4 mg (4 mg Intravenous Given 9/9/22 0647)   ondansetron (ZOFRAN) injection 4 mg (4 mg Intravenous Given 9/9/22 0646)   sodium chloride 0.9 % bolus 1,000 mL (0 mL Intravenous Stopped 9/9/22 0831)   iopamidol (ISOVUE-300) 61 % injection 100 mL (95 mL Intravenous Given 9/9/22 0821)       Imaging results:  CT Abdomen Pelvis With Contrast    Result Date: 9/9/2022  1. There is a thickened appearance of the gastric antrum. Please correlate clinically for gastritis. 2. There is a portion of the appendix which is marginally thickened with a transverse diameter of 7 mm, upper limits of normal is 6 mm. There is no surrounding inflammatory change or convincing evidence for acute appendicitis, but very mild or early appendicitis cannot be excluded.  Discussed with VINAY Hendricks.        Ambulatory status:   - up ad kaia     Social issues:   Social History     Socioeconomic History   • Marital status: Single   Tobacco Use   • Smoking status: Current Every Day Smoker     Types: Electronic Cigarette   Substance and Sexual Activity   • Alcohol use: Yes     Comment: socially

## 2022-09-09 NOTE — ED PROVIDER NOTES
MD ATTESTATION NOTE    The TARIQ and I have discussed this patient's history, physical exam, and treatment plan.  I have reviewed the documentation and personally had a face to face interaction with the patient. I affirm the documentation and agree with the treatment and plan.  The attached note describes my personal findings.      I provided a substantive portion of the care of the patient.  I personally performed the physical exam in its entirety, and below are my findings.  For this patient encounter, the patient wore surgical mask, I wore full protective PPE including N95 and eye protection.      Brief HPI: Patient presents for evaluation of right lower quadrant abdominal pain.  Patient states pain has been present for about 3 days.  Has had some diarrhea with no vomiting.  Has had no prior abdominal surgeries.    PHYSICAL EXAM  ED Triage Vitals [09/09/22 0524]   Temp Heart Rate Resp BP SpO2   98.2 °F (36.8 °C) 76 16 (!) 140/113 99 %      Temp src Heart Rate Source Patient Position BP Location FiO2 (%)   Oral Monitor Sitting Right arm --         GENERAL: no acute distress  HENT: nares patent  EYES: no scleral icterus  CV: regular rhythm, normal rate  RESPIRATORY: normal effort  ABDOMEN: soft, tenderness in the right lower quadrant  MUSCULOSKELETAL: no deformity  NEURO: alert, moves all extremities, follows commands  PSYCH:  calm, cooperative  SKIN: warm, dry    Vital signs and nursing notes reviewed.        Plan: Lab evaluation, CT scan       Ananda Ramsey MD  09/09/22 3237

## 2022-09-09 NOTE — ED NOTES
Pt arrived by private vehicle reporting abdominal pain in the center of abd for 2 days. Pt denies vomiting but is nauseous. Pt reports that he has had diarrhea once. Pt is ambulatory in triage.

## 2022-09-09 NOTE — CASE MANAGEMENT/SOCIAL WORK
Discharge Planning Assessment  [facility]     Patient Name: Adam Odom  MRN: [id number]  Today's Date: 9/9/2022    Admit Date: 9/9/2022     Discharge Needs Assessment     Row Name 09/09/22 1144       Living Environment    People in Home significant other    Current Living Arrangements home    Primary Care Provided by self    Provides Primary Care For no one    Family Caregiver if Needed significant other    Quality of Family Relationships supportive    Able to Return to Prior Arrangements yes       Resource/Environmental Concerns    Resource/Environmental Concerns none       Transition Planning    Patient/Family Anticipates Transition to home with family    Patient/Family Anticipated Services at Transition none    Transportation Anticipated family or friend will provide       Discharge Needs Assessment    Equipment Currently Used at Home none    Concerns to be Addressed no discharge needs identified    Anticipated Changes Related to Illness none    Equipment Needed After Discharge none    Provided Post Acute Provider List? N/A    Provided Post Acute Provider Quality & Resource List? N/A               Discharge Plan     Row Name 09/09/22 1145       Plan    Plan Introduced self and explained role of CCP. PPE used    Provided Post Acute Provider List? N/A    Provided Post Acute Provider Quality & Resource List? N/A    Plan Comments Plans to return home at d/c and can arrange own transport. Independent w/ ADLs. No assistive devices used. Denies any d/c needs or financial concerns. No PCP-gave/explained [facility] [place]liasion [facility] and [facility] list              Continued Care and Services - Admitted Since 9/9/2022    Coordination has not been started for this encounter.          Demographic Summary    No documentation.                Functional Status    No documentation.                Psychosocial    No documentation.                Abuse/Neglect    No documentation.                Legal    No  documentation.                Substance Abuse    No documentation.                Patient Forms    No documentation.                   Nikole Nuñez RN

## 2022-09-09 NOTE — H&P
[facility]   HISTORY AND PHYSICAL    Patient Name: Adam Odom  : 1990  MRN: [id number]  Primary Care Physician:  Provider, No Known  Date of admission: 2022    Subjective   Subjective     Chief Complaint:   Chief Complaint   Patient presents with   • Abdominal Pain         HPI:    Adam Odom is a 32 y.o. male who uses electronic cigarette/vape who presented to the emergency department today complaining of abdominal pain for 3 days.  Patient states pain is located in right lower quadrant.  He describes the pain as achy and crampy.  Pain does not radiate.  Patient states nothing makes pain better or worse.  Not exacerbated by eating.  Not made better by defecation.  Patient states he has had some nausea but denies vomiting.  Has had 1 episode of diarrhea.  Denies dysuria, urgency, frequency, chest pain, shortness of breath, palpitations, fever and chills.     ED evaluation reviewed, laboratory evaluation largely within normal limits, CT abdomen pelvis shows thickened appearance of the gastric antrum as well as mildly enlarged appendix at 7 mm, marginally thickened, no surrounding inflammatory change.  ED provider discussed case with general surgery, they requested observation unit admission for monitoring.    Review of Systems   All systems were reviewed and negative except for: What is discussed in the HPI    Personal History     History reviewed. No pertinent past medical history.    History reviewed. No pertinent surgical history.    Family History: family history is not on file. Otherwise pertinent FHx was reviewed and not pertinent to current issue.    Social History:  reports that he has been smoking electronic cigarette. He does not have any smokeless tobacco history on file. He reports current alcohol use.    Home Medications:  ciprofloxacin    Allergies:  Allergies   Allergen Reactions   • Benadryl [Diphenhydramine Hcl (Sleep)]        Objective   Objective     Vitals:   Temp:  [98.2 °F  (36.8 °C)] 98.2 °F (36.8 °C)  Heart Rate:  [60-77] 64  Resp:  [16-18] 18  BP: (129-146)/() 129/83  Physical Exam     GENERAL: 32 y.o. YO male a/o x 4, NAD  SKIN: Warm pink and dry   HEENT:  PERRL, EOM intact, conjunctivae normal, sclerae clear  NECK: supple, no JVD  LUNGS: Clear to auscultation bilaterally without wheezes, rales or rhonchi.  No accessory muscle use and no nasal flaring.  CARDIAC:  Regular rate and rhythm, S1-S2.  No murmurs, rubs or gallops.  No peripheral edema.  Equal pulses bilaterally.  ABDOMEN: Soft, tender to palpation right lower quadrant, nondistended.  No guarding or rebound tenderness.  Normal bowel sounds.  MUSCULOSKELETAL: Moves all extremities well.  No deformity.  NEURO: Cranial nerves II through XII grossly intact.  No gross focal deficits.  Alert.  Normal speech and motor.  PSYCH: Normal mood and affect      Result Review    Result Review:  I have personally reviewed the results from the time of this admission to 9/9/2022 11:54 EDT and agree with these findings:  [x]  Laboratory list / accordion  []  Microbiology  [x]  Radiology  []  EKG/Telemetry   []  Cardiology/Vascular   []  Pathology  []  Old records  []  Other:  Most notable findings include:     CT Abdomen Pelvis With Contrast    Result Date: 9/9/2022  1. There is a thickened appearance of the gastric antrum. Please correlate clinically for gastritis. 2. There is a portion of the appendix which is marginally thickened with a transverse diameter of 7 mm, upper limits of normal is 6 mm. There is no surrounding inflammatory change or convincing evidence for acute appendicitis, but very mild or early appendicitis cannot be excluded.  Discussed with VINAY Hendricks.            Assessment & Plan   Assessment / Plan     Brief Patient Summary:  Adam Odom is a 32 y.o. male who is being mated to observation unit for further evaluation of abdominal pain.    Active Hospital Problems:  Active Hospital Problems    Diagnosis    •  Abdominal pain      Plan:     Abdominal pain  -CT scan reviewed, very mild or early appendicitis cannot be excluded  -Patient received 4 of morphine in the ED and is currently pain-free  -Consult general surgery  -N.p.o., IV fluids  -Pain control as needed   -Continue to monitor      DVT prophylaxis:  Mechanical DVT prophylaxis orders are present.    CODE STATUS:    Level Of Support Discussed With: Patient  Code Status (Patient has no pulse and is not breathing): CPR (Attempt to Resuscitate)  Medical Interventions (Patient has pulse or is breathing): Full Support    Admission Status:  I believe this patient meets observation status.    I wore an N95 mask, face shield, and gloves during this patient encounter. Patient also wearing a surgical mask throughout encounter. Hand hygeine performed before and after seeing the patient.    Electronically signed by VINAY Bonner, 09/09/22, 11:00 AM EDT.

## 2022-09-09 NOTE — CONSULTS
Consultation note    Referring physician:   Dr. Ramsey    Consulting Physician: Malick Mane MD    Reason for consultation: Abdominal pain: AbnormalCT scan of the appendix    Chief Complaint   Patient presents with   • Abdominal Pain       HPI:   The patient is a very pleasant 32 y.o. years old male that presented to the hospital with abdominal pain.  He reports abdominal pain that started last Wednesday after eating some mail to order meals with his mom.  Wednesday night he developed severe epigastric and periumbilical abdominal pain.  The pain radiated to the right upper and right lower abdomen.  He reports having 1 bowel movement on Wednesday.  This was followed by 2 episodes of loose watery bowel movements.  On Thursday pain continued but was stable.  He decided to come to emergency room today for further evaluation due to persistent of his pain.  He denies any nausea or vomiting, fevers or chills.  Reports no similar episode of pain like this in the past.  Denies any other recent change in bowel habits.  CT scan abdomen pelvis that was performed show a distal appendix there is mildly distended without any signs of inflammation.  There is a moderate amount of stool in the right side of the colon.  No other major abnormalities.  He was admitted for observation unit and was doing fine after he was given morphine.  His pain has recurred this afternoon.      History reviewed. No pertinent past medical history.    History reviewed. No pertinent surgical history.      Current Facility-Administered Medications:   •  acetaminophen (TYLENOL) tablet 650 mg, 650 mg, Oral, Q4H PRN, Roshan Martinez MD  •  ketorolac (TORADOL) injection 30 mg, 30 mg, Intravenous, Q6H PRN, Roshan Martinez MD  •  lactated ringers infusion, 100 mL/hr, Intravenous, Continuous, Roshan Martinez MD, Last Rate: 100 mL/hr at 09/09/22 1212, 100 mL/hr at 09/09/22 1212  •  melatonin tablet 5 mg, 5 mg, Oral, Nightly PRN, Roshan Martinez  MD  •  nitroglycerin (NITROSTAT) SL tablet 0.4 mg, 0.4 mg, Sublingual, Q5 Min PRN, Roshan Martinez MD  •  ondansetron (ZOFRAN) tablet 4 mg, 4 mg, Oral, Q6H PRN **OR** ondansetron (ZOFRAN) injection 4 mg, 4 mg, Intravenous, Q6H PRN, Roshan Martinez MD  •  [COMPLETED] Insert peripheral IV, , , Once **AND** sodium chloride 0.9 % flush 10 mL, 10 mL, Intravenous, PRN, Joes Jefferson PA, 10 mL at 09/09/22 0645  •  sodium chloride 0.9 % flush 10 mL, 10 mL, Intravenous, Q12H, Roshan Martinez MD, 10 mL at 09/09/22 1212  •  sodium chloride 0.9 % flush 10 mL, 10 mL, Intravenous, PRN, Roshan Martinez MD    Allergies   Allergen Reactions   • Benadryl [Diphenhydramine Hcl (Sleep)]        Social History     Socioeconomic History   • Marital status: Single   Tobacco Use   • Smoking status: Current Every Day Smoker     Types: Electronic Cigarette   Substance and Sexual Activity   • Alcohol use: Yes     Comment: socially       History reviewed. No pertinent family history.    ROS:   Constitutional: denies any weight changes, fatigue or weakness.  Eyes: : denies blurred or double vision  Cardiovascular: denies chest pain, palpitations, edemas.  Respiratory: denies cough, sputum, SOB.  Gastrointestinal: Denies heartburn or regurgitation.  Genitourinary: denies dysuria, frequency.  Endocrine: denies cold intolerance, lethargy and flushing.  Hem: denies excessive bruising and postop bleeding.  Musculoskeletal: denies weakness, joint swelling, pain or stiffness.  Neuro: denies seizures, CVA, paresthesia, or peripheral neuropathy.   Skin: denies change in nevi, rashes, masses.    Physical Exam:   Vitals:    09/09/22 1531   BP: 119/91   Pulse: 57   Resp: 14   Temp: 98.4 °F (36.9 °C)   SpO2: 98%     GENERAL:alert, well appearing, and in no distress and oriented to person, place, and time  HEENT: normochephalic, atraumatic, no scleral icterus moist mucous membranes.  NECK: Supple there is no thyromegaly or lymphadenopathy  CHEST:  clear to auscultation, no wheezes, rales or rhonchi, symmetric air entry  CARDIAC: regular rate and rhythm    ABDOMEN: soft, tenderness to palpation in the periumbilical area, right upper quadrant and right mid abdomen, nondistended, no masses or organomegaly.  No rebound or guarding no peritoneal signs, no hernia  EXTREMITIES: no cyanosis, clubbing or edema    NEURO: alert and oriented, normal speech, cranial nerves 2-12 grossly intact, no focal deficits   SKIN: Moist, warm, no rashes.    Diagnostic workup:   CT scan abdomen pelvis that was performed today showed a questionable thickening of the distal portion of the appendix with a diameter of 7 mm.  There is no inflammation surrounding the appendix.  Thickening of the gastric antrum.    White blood cell count 9.1, hemoglobin 14.2, platelets 351, alk phos 123, otherwise normal CMP, lipase 54    Assessment and plan:   The patient is a very pleasant 32 y.o. years old male with abdominal pain, unclear etiology that may have been related to food poisoning, questionable abnormality on CT scan of the gastric antrum as well as the appendix.  No typical physical exam needed history for acute appendicitis.  His white blood cell count is normal with no elevation of neutrophils.  His pain on exam is mostly on the right upper quadrant.  Discussed with the patient about further step.  I recommend that he undergoes ultrasound of the gallbladder for further assessment.  I would recommend that he get started on PPI in the meantime for abnormal appearance of the antrum  Clear liquid diet  Will reevaluate tomorrow morning and decide whether still concerns for possible appendicitis.    We will continue to follow      Case was discussed with nurse practitioner at observation unit and patient.    Risk and benefits of the current recommended treatment were explained to the patient that had time to ask questions that where answered, verbalized understanding and agreed with the plan.      Malick Mane MD  General, Minimally Invasive and Endoscopic Surgery  [facility]    [address], [address]  [place], [place], [address]  P: [phone]  F: [phone]

## 2022-09-09 NOTE — PLAN OF CARE
Goal Outcome Evaluation:  Plan of Care Reviewed With: patient      Pt admitted to the observation unit for R mid abd pain Ct shows possible gastritis. Dr Mane with general surgery will order an US and then pt can have clear liquids. Pt alert and orient times 4, NAD, up with assist, SB on the monitor. Pt has been sleeping most the admission due to pain medication Morphine that was administered in the ER. IVF's infusing @ 100/hr via dial a flow.   Progress: no change

## 2022-09-10 VITALS
HEART RATE: 77 BPM | SYSTOLIC BLOOD PRESSURE: 113 MMHG | BODY MASS INDEX: 25.05 KG/M2 | DIASTOLIC BLOOD PRESSURE: 80 MMHG | OXYGEN SATURATION: 99 % | TEMPERATURE: 97.7 F | RESPIRATION RATE: 16 BRPM | HEIGHT: 70 IN | WEIGHT: 175 LBS

## 2022-09-10 LAB
ANION GAP SERPL CALCULATED.3IONS-SCNC: 8 MMOL/L (ref 5–15)
BUN SERPL-MCNC: 6 MG/DL (ref 6–20)
BUN/CREAT SERPL: 6.7 (ref 7–25)
CALCIUM SPEC-SCNC: 8.9 MG/DL (ref 8.6–10.5)
CHLORIDE SERPL-SCNC: 101 MMOL/L (ref 98–107)
CO2 SERPL-SCNC: 27 MMOL/L (ref 22–29)
CREAT SERPL-MCNC: 0.89 MG/DL (ref 0.76–1.27)
DEPRECATED RDW RBC AUTO: 40.7 FL (ref 37–54)
EGFRCR SERPLBLD CKD-EPI 2021: 115.3 ML/MIN/1.73
ERYTHROCYTE [DISTWIDTH] IN BLOOD BY AUTOMATED COUNT: 12.4 % (ref 12.3–15.4)
GLUCOSE SERPL-MCNC: 115 MG/DL (ref 65–99)
HCT VFR BLD AUTO: 43.6 % (ref 37.5–51)
HGB BLD-MCNC: 14.3 G/DL (ref 13–17.7)
MCH RBC QN AUTO: 29.4 PG (ref 26.6–33)
MCHC RBC AUTO-ENTMCNC: 32.8 G/DL (ref 31.5–35.7)
MCV RBC AUTO: 89.7 FL (ref 79–97)
PLATELET # BLD AUTO: 327 10*3/MM3 (ref 140–450)
PMV BLD AUTO: 10 FL (ref 6–12)
POTASSIUM SERPL-SCNC: 3.4 MMOL/L (ref 3.5–5.2)
RBC # BLD AUTO: 4.86 10*6/MM3 (ref 4.14–5.8)
SODIUM SERPL-SCNC: 136 MMOL/L (ref 136–145)
WBC NRBC COR # BLD: 8.83 10*3/MM3 (ref 3.4–10.8)

## 2022-09-10 PROCEDURE — G0378 HOSPITAL OBSERVATION PER HR: HCPCS

## 2022-09-10 PROCEDURE — 99212 OFFICE O/P EST SF 10 MIN: CPT | Performed by: SURGERY

## 2022-09-10 PROCEDURE — 80048 BASIC METABOLIC PNL TOTAL CA: CPT | Performed by: EMERGENCY MEDICINE

## 2022-09-10 PROCEDURE — 85027 COMPLETE CBC AUTOMATED: CPT | Performed by: EMERGENCY MEDICINE

## 2022-09-10 RX ORDER — AMOXICILLIN AND CLAVULANATE POTASSIUM 875; 125 MG/1; MG/1
1 TABLET, FILM COATED ORAL EVERY 12 HOURS SCHEDULED
Qty: 14 TABLET | Refills: 0 | Status: SHIPPED | OUTPATIENT
Start: 2022-09-10 | End: 2022-09-17

## 2022-09-10 RX ORDER — ONDANSETRON 4 MG/1
4 TABLET, ORALLY DISINTEGRATING ORAL EVERY 8 HOURS PRN
Qty: 15 TABLET | Refills: 0 | Status: SHIPPED | OUTPATIENT
Start: 2022-09-10 | End: 2022-09-15

## 2022-09-10 RX ORDER — METRONIDAZOLE 500 MG/1
500 TABLET ORAL 3 TIMES DAILY
Qty: 21 TABLET | Refills: 0 | Status: SHIPPED | OUTPATIENT
Start: 2022-09-10 | End: 2022-09-10 | Stop reason: HOSPADM

## 2022-09-10 RX ORDER — LEVOFLOXACIN 500 MG/1
500 TABLET, FILM COATED ORAL DAILY
Qty: 7 TABLET | Refills: 0 | Status: SHIPPED | OUTPATIENT
Start: 2022-09-10 | End: 2022-09-10 | Stop reason: HOSPADM

## 2022-09-10 RX ORDER — AMOXICILLIN AND CLAVULANATE POTASSIUM 875; 125 MG/1; MG/1
1 TABLET, FILM COATED ORAL EVERY 12 HOURS SCHEDULED
Status: DISCONTINUED | OUTPATIENT
Start: 2022-09-10 | End: 2022-09-10 | Stop reason: HOSPADM

## 2022-09-10 RX ADMIN — PANTOPRAZOLE SODIUM 40 MG: 40 TABLET, DELAYED RELEASE ORAL at 08:17

## 2022-09-10 RX ADMIN — ACETAMINOPHEN 650 MG: 325 TABLET, FILM COATED ORAL at 09:45

## 2022-09-10 NOTE — PROGRESS NOTES
MD ATTESTATION NOTE    The TARIQ and I have discussed this patient's history, physical exam, and treatment plan.  I have reviewed the documentation and personally had a face to face interaction with the patient. I affirm the documentation and agree with the treatment and plan.  The attached note describes my personal findings.      I provided a substantive portion of the care of the patient.  I personally performed the physical exam in its entirety, and below are my findings.  For this patient encounter, the patient wore surgical mask, I wore full protective PPE including N95 and eye protection.      Brief HPI: Patient noted for abdominal pain.  Patient had mildly distended appendix on CT.  Admitted with surgery consulted.    PHYSICAL EXAM  ED Triage Vitals [09/09/22 0524]   Temp Heart Rate Resp BP SpO2   98.2 °F (36.8 °C) 76 16 (!) 140/113 99 %      Temp src Heart Rate Source Patient Position BP Location FiO2 (%)   Oral Monitor Sitting Right arm --         GENERAL: no acute distress  HENT: nares patent  EYES: no scleral icterus  CV: regular rhythm, normal rate  RESPIRATORY: normal effort  ABDOMEN: soft  MUSCULOSKELETAL: no deformity  NEURO: alert, moves all extremities, follows commands  PSYCH:  calm, cooperative  SKIN: warm, dry    Vital signs and nursing notes reviewed.        Plan: General surgery consulted.  Right upper quadrant ultrasound negative.  General surgery plans to reevaluate this morning.

## 2022-09-10 NOTE — PROGRESS NOTES
Cc: Abdominal pain    S: No events overnight.  Resting comfortable, minimal abdominal discomfort in the periumbilical area.  No nausea no vomiting.  He is hungry wants to eat    O:   Vitals:    09/09/22 1948 09/09/22 2336 09/10/22 0711 09/10/22 0816   BP: 118/91 119/68  113/80   BP Location: Left arm Right arm  Right arm   Patient Position: Lying Lying  Lying   Pulse: 66 63  77   Resp: 16 16 16 16   Temp: 98 °F (36.7 °C) 97.9 °F (36.6 °C)  97.7 °F (36.5 °C)   TempSrc: Oral Oral Oral Oral   SpO2: 100% 100%  99%   Weight:       Height:         Alert, no acute distress  Breathing comfortable  Abdomen soft, mildly tender over the right big umbilical area, nondistended, no rebound or guarding no peritoneal sign    Normal white blood cell count, hemoglobin and platelets, no neutrophil elevation    Assessment and plan    32-year-old male with abdominal pain, no clear evidence of acute appendicitis on exam needed labs or imaging.  Discussed with the patient about further step.  I discussed with him about the possibility of antibiotic treatment versus laparoscopic appendectomy.  I do not think at this time he has any signs or symptoms of acute appendicitis but he may develop worsening pain that may require him to be reevaluated for possible appendectomy.  He is hungry, wants to eat and is feeling better and does not want to have any surgical intervention for now.  I recommend that he goes home on 7 days on Augmentin.  He will follow-up in my office in 2 weeks for reevaluation.  He will come to the emergency room again if symptoms worsen.  He understood    Follow-up in my office in 2 weeks  Come to the ER if symptoms worsen

## 2022-09-10 NOTE — PROGRESS NOTES
ED OBSERVATION PROGRESS/DISCHARGE SUMMARY    Date of Admission: 9/9/2022   LOS: 0 days   PCP: Provider, No Known    Patient seen at: Observation unit  Subjective   Denies complaints  Hospital Outcome:   32-year-old male to the emergency department with right lower quadrant pain and 1 episode of diarrhea.  Lab work is within normal limits other than an alk phos of 123.  Urinalysis was negative.  CT abdomen and pelvis shows thickening appearance of the gastric antrum, there is a portion of the appendix that is marginally thickened with a transverse diameter of 7 mm, upper limits of normal is 6 mm.  There is no surrounding inflammatory change or convincing evidence for acute appendicitis, but very mild or early appendicitis cannot be excluded.  Ultrasound of gallbladder completed and resulted as no evidence for acute cholecystitis.  Dr. Mane, surgery, has seen patient and will reevaluate this morning for appendicitis.    ROS:  General: no fevers, chills  Respiratory: no cough, dyspnea  Cardiovascular: no chest pain, palpitations  Abdomen: No abdominal pain, nausea, vomiting, or diarrhea  Neurologic: No focal weakness    Objective   Physical Exam:  I have reviewed the vital signs.  Temp:  [97.9 °F (36.6 °C)-98.4 °F (36.9 °C)] 97.9 °F (36.6 °C)  Heart Rate:  [57-69] 63  Resp:  [14-18] 16  BP: (118-146)/(68-95) 119/68  General Appearance:    Alert, cooperative, no distress  Head:    Normocephalic, atraumatic  Eyes:    Sclerae anicteric  Neck:   Supple, no mass  Lungs: Clear to auscultation bilaterally, respirations unlabored  Heart: Regular rate and rhythm, S1 and S2 normal, no murmur, rub or gallop  Abdomen:  Soft, non-tender, bowel sounds active, nondistended  Extremities: No clubbing, cyanosis, or edema to lower extremities  Pulses:  2+ and symmetric in distal lower extremities  Skin: No rashes   Neurologic: Oriented x3, Normal strength to extremities    Results Review:    I have reviewed the labs, radiology results  and diagnostic studies.    Results from last 7 days   Lab Units 09/10/22  0352   WBC 10*3/mm3 8.83   HEMOGLOBIN g/dL 14.3   HEMATOCRIT % 43.6   PLATELETS 10*3/mm3 327     Results from last 7 days   Lab Units 09/09/22  0644   SODIUM mmol/L 137   POTASSIUM mmol/L 3.5   CHLORIDE mmol/L 100   CO2 mmol/L 28.9   BUN mg/dL 8   CREATININE mg/dL 0.88   CALCIUM mg/dL 9.5   BILIRUBIN mg/dL 0.3   ALK PHOS U/L 123*   ALT (SGPT) U/L 20   AST (SGOT) U/L 21   GLUCOSE mg/dL 98     Imaging Results (Last 24 Hours)     Procedure Component Value Units Date/Time    US Gallbladder [[id number]] Collected: 09/09/22 1840     Updated: 09/09/22 1847    Narrative:      US GALLBLADDER-     INDICATIONS: Abdominal pain     TECHNIQUE: Ultrasound of the right upper quadrant     COMPARISON: CT from same date     FINDINGS:     The gallbladder is nontender, with no stones demonstrated. No  gallbladder wall thickening or pericholecystic fluid.     No intrahepatic or extrahepatic biliary ductal dilatation is  demonstrated. The common biliary duct caliber is measured at 2 mm.     No liver lesion is identified. Diffusely, the echogenicity of the liver  is otherwise in the range of normal relative to that of the right  kidney.     The pancreas is partly obscured. The right kidney, 9.1 cm, and the  pancreas otherwise appear unremarkable.                Impression:         No evidence for acute cholecystitis. With persistent clinical  indication, hepatobiliary scintigraphy could be considered for further  evaluation.           This report was finalized on 9/9/2022 6:44 PM by Dr. Dayton Kendall M.D.       CT Abdomen Pelvis With Contrast [[id number]] Collected: 09/09/22 0858     Updated: 09/09/22 0858    Narrative:      CT ABDOMEN AND PELVIS WITH IV CONTRAST     HISTORY: 32-year-old male with right lower quadrant pain.     TECHNIQUE: Radiation dose reduction techniques were utilized, including  automated exposure control and exposure modulation based on body  size.   3 mm images were obtained through the abdomen and pelvis after the  administration of IV contrast. There is no previous CT for comparison.     FINDINGS: The liver, gallbladder, spleen, pancreas, adrenals, and both  kidneys appear unremarkable. The gastric antrum appears thickened and  there is mild surrounding haziness, image 53. There is marginal  thickening of the adjacent aspect of the transverse colon. The appendix  measures 6-7 mm in diameter, image 70. There is no surrounding  inflammatory change or fluid. There is no free fluid or lymphadenopathy.       Impression:      1. There is a thickened appearance of the gastric antrum. Please  correlate clinically for gastritis.  2. There is a portion of the appendix which is marginally thickened with  a transverse diameter of 7 mm, upper limits of normal is 6 mm. There is  no surrounding inflammatory change or convincing evidence for acute  appendicitis, but very mild or early appendicitis cannot be excluded.     Discussed with VINAY Hendricks.             I have reviewed the medications.  ---------------------------------------------------------------------------------------------  Assessment & Plan   Assessment/Problem List    Abdominal pain      Plan  Abdominal pain  -CT scan reviewed, very mild or early appendicitis cannot be excluded  -Patient received 4 of morphine in the ED and is currently pain-free  -Consult general surgery  -N.p.o., IV fluids  -Pain control as needed   -Continue to monitor       Disposition: home    Follow-up after Discharge: PCP, Surgery in 2 weeks    This note will serve as discharge summary    Bernice Montalvo, MAMI 09/10/22 03:22 EDT

## 2022-09-10 NOTE — DISCHARGE INSTRUCTIONS
Medications as prescribed.  Follow up with general surgery in 2 weeks.  Return to the emergency department with worsening symptoms, uncontrolled pain, inability to tolerate oral liquids, fever greater than 101°F not controlled by Tylenol or as needed with emergent concerns.

## 2022-09-10 NOTE — PLAN OF CARE
Problem: Adult Inpatient Plan of Care  Goal: Plan of Care Review  Outcome: Met  Goal: Patient-Specific Goal (Individualized)  Outcome: Met  Goal: Absence of Hospital-Acquired Illness or Injury  Outcome: Met  Intervention: Identify and Manage Fall Risk  Recent Flowsheet Documentation  Taken 9/10/2022 0945 by Brenda Wise RN  Safety Promotion/Fall Prevention: safety round/check completed  Taken 9/10/2022 0819 by Brenda Wise RN  Safety Promotion/Fall Prevention: safety round/check completed  Intervention: Prevent Skin Injury  Recent Flowsheet Documentation  Taken 9/10/2022 0819 by Brenda Wise RN  Body Position: position changed independently  Intervention: Prevent and Manage VTE (Venous Thromboembolism) Risk  Recent Flowsheet Documentation  Taken 9/10/2022 0819 by Brenda Wise RN  Activity Management: activity adjusted per tolerance  Goal: Optimal Comfort and Wellbeing  Outcome: Met  Intervention: Provide Person-Centered Care  Recent Flowsheet Documentation  Taken 9/10/2022 0819 by Brenda Wise RN  Trust Relationship/Rapport: care explained  Goal: Readiness for Transition of Care  Outcome: Met   Goal Outcome Evaluation:

## 2022-09-13 ENCOUNTER — APPOINTMENT (OUTPATIENT)
Dept: CT IMAGING | Facility: HOSPITAL | Age: 32
End: 2022-09-13

## 2022-09-13 ENCOUNTER — ANESTHESIA EVENT (OUTPATIENT)
Dept: GASTROENTEROLOGY | Facility: HOSPITAL | Age: 32
End: 2022-09-13

## 2022-09-13 ENCOUNTER — APPOINTMENT (OUTPATIENT)
Dept: GENERAL RADIOLOGY | Facility: HOSPITAL | Age: 32
End: 2022-09-13

## 2022-09-13 ENCOUNTER — HOSPITAL ENCOUNTER (OUTPATIENT)
Facility: HOSPITAL | Age: 32
Discharge: LEFT BEFORE DISCHARGE | End: 2022-09-15
Attending: EMERGENCY MEDICINE | Admitting: STUDENT IN AN ORGANIZED HEALTH CARE EDUCATION/TRAINING PROGRAM

## 2022-09-13 ENCOUNTER — ANESTHESIA (OUTPATIENT)
Dept: GASTROENTEROLOGY | Facility: HOSPITAL | Age: 32
End: 2022-09-13

## 2022-09-13 DIAGNOSIS — K25.4 GASTROINTESTINAL HEMORRHAGE ASSOCIATED WITH GASTRIC ULCER: Primary | ICD-10-CM

## 2022-09-13 DIAGNOSIS — K27.9 PEPTIC ULCER: ICD-10-CM

## 2022-09-13 DIAGNOSIS — D62 ABLA (ACUTE BLOOD LOSS ANEMIA): ICD-10-CM

## 2022-09-13 PROBLEM — K26.9 DUODENAL ULCER: Status: ACTIVE | Noted: 2022-09-13

## 2022-09-13 LAB
ABO GROUP BLD: NORMAL
ALBUMIN SERPL-MCNC: 3.7 G/DL (ref 3.5–5.2)
ALBUMIN/GLOB SERPL: 1.5 G/DL
ALP SERPL-CCNC: 95 U/L (ref 39–117)
ALT SERPL W P-5'-P-CCNC: 14 U/L (ref 1–41)
ANION GAP SERPL CALCULATED.3IONS-SCNC: 7 MMOL/L (ref 5–15)
ANION GAP SERPL CALCULATED.3IONS-SCNC: 9.2 MMOL/L (ref 5–15)
AST SERPL-CCNC: 20 U/L (ref 1–40)
BACTERIA UR QL AUTO: NORMAL /HPF
BASOPHILS # BLD AUTO: 0.06 10*3/MM3 (ref 0–0.2)
BASOPHILS # BLD AUTO: 0.07 10*3/MM3 (ref 0–0.2)
BASOPHILS NFR BLD AUTO: 0.9 % (ref 0–1.5)
BASOPHILS NFR BLD AUTO: 1 % (ref 0–1.5)
BILIRUB SERPL-MCNC: <0.2 MG/DL (ref 0–1.2)
BILIRUB UR QL STRIP: NEGATIVE
BLD GP AB SCN SERPL QL: NEGATIVE
BUN SERPL-MCNC: 11 MG/DL (ref 6–20)
BUN SERPL-MCNC: 19 MG/DL (ref 6–20)
BUN/CREAT SERPL: 16.2 (ref 7–25)
BUN/CREAT SERPL: 22.6 (ref 7–25)
CALCIUM SPEC-SCNC: 8.8 MG/DL (ref 8.6–10.5)
CALCIUM SPEC-SCNC: 8.8 MG/DL (ref 8.6–10.5)
CHLORIDE SERPL-SCNC: 105 MMOL/L (ref 98–107)
CHLORIDE SERPL-SCNC: 106 MMOL/L (ref 98–107)
CLARITY UR: ABNORMAL
CO2 SERPL-SCNC: 23.8 MMOL/L (ref 22–29)
CO2 SERPL-SCNC: 27 MMOL/L (ref 22–29)
COLOR UR: YELLOW
CREAT SERPL-MCNC: 0.68 MG/DL (ref 0.76–1.27)
CREAT SERPL-MCNC: 0.84 MG/DL (ref 0.76–1.27)
DEPRECATED RDW RBC AUTO: 41.5 FL (ref 37–54)
DEPRECATED RDW RBC AUTO: 41.9 FL (ref 37–54)
EGFRCR SERPLBLD CKD-EPI 2021: 117.4 ML/MIN/1.73
EGFRCR SERPLBLD CKD-EPI 2021: 125.1 ML/MIN/1.73
EOSINOPHIL # BLD AUTO: 0.08 10*3/MM3 (ref 0–0.4)
EOSINOPHIL # BLD AUTO: 0.14 10*3/MM3 (ref 0–0.4)
EOSINOPHIL NFR BLD AUTO: 1.3 % (ref 0.3–6.2)
EOSINOPHIL NFR BLD AUTO: 1.9 % (ref 0.3–6.2)
ERYTHROCYTE [DISTWIDTH] IN BLOOD BY AUTOMATED COUNT: 12.6 % (ref 12.3–15.4)
ERYTHROCYTE [DISTWIDTH] IN BLOOD BY AUTOMATED COUNT: 12.9 % (ref 12.3–15.4)
FERRITIN SERPL-MCNC: 108 NG/ML (ref 30–400)
GLOBULIN UR ELPH-MCNC: 2.5 GM/DL
GLUCOSE SERPL-MCNC: 111 MG/DL (ref 65–99)
GLUCOSE SERPL-MCNC: 111 MG/DL (ref 65–99)
GLUCOSE UR STRIP-MCNC: NEGATIVE MG/DL
HCT VFR BLD AUTO: 31.5 % (ref 37.5–51)
HCT VFR BLD AUTO: 31.5 % (ref 37.5–51)
HCT VFR BLD AUTO: 32.3 % (ref 37.5–51)
HGB BLD-MCNC: 10.6 G/DL (ref 13–17.7)
HGB UR QL STRIP.AUTO: NEGATIVE
HOLD SPECIMEN: NORMAL
HOLD SPECIMEN: NORMAL
HYALINE CASTS UR QL AUTO: NORMAL /LPF
IMM GRANULOCYTES # BLD AUTO: 0.01 10*3/MM3 (ref 0–0.05)
IMM GRANULOCYTES # BLD AUTO: 0.01 10*3/MM3 (ref 0–0.05)
IMM GRANULOCYTES NFR BLD AUTO: 0.1 % (ref 0–0.5)
IMM GRANULOCYTES NFR BLD AUTO: 0.2 % (ref 0–0.5)
IRON 24H UR-MRATE: 41 MCG/DL (ref 59–158)
IRON SATN MFR SERPL: 12 % (ref 20–50)
KETONES UR QL STRIP: NEGATIVE
LEUKOCYTE ESTERASE UR QL STRIP.AUTO: ABNORMAL
LIPASE SERPL-CCNC: 27 U/L (ref 13–60)
LYMPHOCYTES # BLD AUTO: 2.11 10*3/MM3 (ref 0.7–3.1)
LYMPHOCYTES # BLD AUTO: 3.4 10*3/MM3 (ref 0.7–3.1)
LYMPHOCYTES NFR BLD AUTO: 33.4 % (ref 19.6–45.3)
LYMPHOCYTES NFR BLD AUTO: 45.5 % (ref 19.6–45.3)
MCH RBC QN AUTO: 29.4 PG (ref 26.6–33)
MCH RBC QN AUTO: 30.3 PG (ref 26.6–33)
MCHC RBC AUTO-ENTMCNC: 32.8 G/DL (ref 31.5–35.7)
MCHC RBC AUTO-ENTMCNC: 33.7 G/DL (ref 31.5–35.7)
MCV RBC AUTO: 89.7 FL (ref 79–97)
MCV RBC AUTO: 90 FL (ref 79–97)
MONOCYTES # BLD AUTO: 0.56 10*3/MM3 (ref 0.1–0.9)
MONOCYTES # BLD AUTO: 0.75 10*3/MM3 (ref 0.1–0.9)
MONOCYTES NFR BLD AUTO: 11.9 % (ref 5–12)
MONOCYTES NFR BLD AUTO: 7.5 % (ref 5–12)
NEUTROPHILS NFR BLD AUTO: 3.29 10*3/MM3 (ref 1.7–7)
NEUTROPHILS NFR BLD AUTO: 3.3 10*3/MM3 (ref 1.7–7)
NEUTROPHILS NFR BLD AUTO: 44.1 % (ref 42.7–76)
NEUTROPHILS NFR BLD AUTO: 52.2 % (ref 42.7–76)
NITRITE UR QL STRIP: NEGATIVE
NRBC BLD AUTO-RTO: 0 /100 WBC (ref 0–0.2)
NRBC BLD AUTO-RTO: 0 /100 WBC (ref 0–0.2)
PH UR STRIP.AUTO: 6 [PH] (ref 5–8)
PLATELET # BLD AUTO: 259 10*3/MM3 (ref 140–450)
PLATELET # BLD AUTO: 260 10*3/MM3 (ref 140–450)
PMV BLD AUTO: 10.3 FL (ref 6–12)
PMV BLD AUTO: 10.6 FL (ref 6–12)
POTASSIUM SERPL-SCNC: 3.9 MMOL/L (ref 3.5–5.2)
POTASSIUM SERPL-SCNC: 4.1 MMOL/L (ref 3.5–5.2)
PROT SERPL-MCNC: 6.2 G/DL (ref 6–8.5)
PROT UR QL STRIP: NEGATIVE
QT INTERVAL: 349 MS
RBC # BLD AUTO: 3.5 10*6/MM3 (ref 4.14–5.8)
RBC # BLD AUTO: 3.6 10*6/MM3 (ref 4.14–5.8)
RBC # UR STRIP: NORMAL /HPF
REF LAB TEST METHOD: NORMAL
RH BLD: POSITIVE
SODIUM SERPL-SCNC: 138 MMOL/L (ref 136–145)
SODIUM SERPL-SCNC: 140 MMOL/L (ref 136–145)
SP GR UR STRIP: 1.02 (ref 1–1.03)
SQUAMOUS #/AREA URNS HPF: NORMAL /HPF
T&S EXPIRATION DATE: NORMAL
TIBC SERPL-MCNC: 352 MCG/DL (ref 298–536)
TRANSFERRIN SERPL-MCNC: 236 MG/DL (ref 200–360)
TROPONIN T SERPL-MCNC: <0.01 NG/ML (ref 0–0.03)
UROBILINOGEN UR QL STRIP: ABNORMAL
WBC # UR STRIP: NORMAL /HPF
WBC NRBC COR # BLD: 6.31 10*3/MM3 (ref 3.4–10.8)
WBC NRBC COR # BLD: 7.47 10*3/MM3 (ref 3.4–10.8)
WHOLE BLOOD HOLD COAG: NORMAL
WHOLE BLOOD HOLD SPECIMEN: NORMAL

## 2022-09-13 PROCEDURE — 82728 ASSAY OF FERRITIN: CPT | Performed by: STUDENT IN AN ORGANIZED HEALTH CARE EDUCATION/TRAINING PROGRAM

## 2022-09-13 PROCEDURE — 96365 THER/PROPH/DIAG IV INF INIT: CPT

## 2022-09-13 PROCEDURE — 85025 COMPLETE CBC W/AUTO DIFF WBC: CPT

## 2022-09-13 PROCEDURE — 84484 ASSAY OF TROPONIN QUANT: CPT | Performed by: EMERGENCY MEDICINE

## 2022-09-13 PROCEDURE — G0378 HOSPITAL OBSERVATION PER HR: HCPCS

## 2022-09-13 PROCEDURE — 85018 HEMOGLOBIN: CPT | Performed by: STUDENT IN AN ORGANIZED HEALTH CARE EDUCATION/TRAINING PROGRAM

## 2022-09-13 PROCEDURE — 86901 BLOOD TYPING SEROLOGIC RH(D): CPT | Performed by: EMERGENCY MEDICINE

## 2022-09-13 PROCEDURE — 96366 THER/PROPH/DIAG IV INF ADDON: CPT

## 2022-09-13 PROCEDURE — 74177 CT ABD & PELVIS W/CONTRAST: CPT

## 2022-09-13 PROCEDURE — 99214 OFFICE O/P EST MOD 30 MIN: CPT | Performed by: SURGERY

## 2022-09-13 PROCEDURE — 25010000002 EPINEPHRINE PER 0.1 MG: Performed by: SURGERY

## 2022-09-13 PROCEDURE — 86850 RBC ANTIBODY SCREEN: CPT | Performed by: EMERGENCY MEDICINE

## 2022-09-13 PROCEDURE — 85025 COMPLETE CBC W/AUTO DIFF WBC: CPT | Performed by: STUDENT IN AN ORGANIZED HEALTH CARE EDUCATION/TRAINING PROGRAM

## 2022-09-13 PROCEDURE — 99284 EMERGENCY DEPT VISIT MOD MDM: CPT

## 2022-09-13 PROCEDURE — 71046 X-RAY EXAM CHEST 2 VIEWS: CPT

## 2022-09-13 PROCEDURE — 25010000002 IOPAMIDOL 61 % SOLUTION: Performed by: EMERGENCY MEDICINE

## 2022-09-13 PROCEDURE — 81001 URINALYSIS AUTO W/SCOPE: CPT

## 2022-09-13 PROCEDURE — 96375 TX/PRO/DX INJ NEW DRUG ADDON: CPT

## 2022-09-13 PROCEDURE — 88305 TISSUE EXAM BY PATHOLOGIST: CPT | Performed by: SURGERY

## 2022-09-13 PROCEDURE — 80053 COMPREHEN METABOLIC PANEL: CPT

## 2022-09-13 PROCEDURE — 25010000002 PROPOFOL 10 MG/ML EMULSION: Performed by: ANESTHESIOLOGY

## 2022-09-13 PROCEDURE — 86900 BLOOD TYPING SEROLOGIC ABO: CPT | Performed by: EMERGENCY MEDICINE

## 2022-09-13 PROCEDURE — 83540 ASSAY OF IRON: CPT | Performed by: STUDENT IN AN ORGANIZED HEALTH CARE EDUCATION/TRAINING PROGRAM

## 2022-09-13 PROCEDURE — 93005 ELECTROCARDIOGRAM TRACING: CPT

## 2022-09-13 PROCEDURE — 85014 HEMATOCRIT: CPT | Performed by: STUDENT IN AN ORGANIZED HEALTH CARE EDUCATION/TRAINING PROGRAM

## 2022-09-13 PROCEDURE — 83690 ASSAY OF LIPASE: CPT

## 2022-09-13 PROCEDURE — 93010 ELECTROCARDIOGRAM REPORT: CPT | Performed by: INTERNAL MEDICINE

## 2022-09-13 PROCEDURE — 93005 ELECTROCARDIOGRAM TRACING: CPT | Performed by: EMERGENCY MEDICINE

## 2022-09-13 PROCEDURE — 25010000002 DROPERIDOL PER 5 MG: Performed by: EMERGENCY MEDICINE

## 2022-09-13 PROCEDURE — 84466 ASSAY OF TRANSFERRIN: CPT | Performed by: STUDENT IN AN ORGANIZED HEALTH CARE EDUCATION/TRAINING PROGRAM

## 2022-09-13 RX ORDER — SODIUM CHLORIDE 0.9 % (FLUSH) 0.9 %
10 SYRINGE (ML) INJECTION AS NEEDED
Status: CANCELLED | OUTPATIENT
Start: 2022-09-13

## 2022-09-13 RX ORDER — SODIUM CHLORIDE, SODIUM LACTATE, POTASSIUM CHLORIDE, CALCIUM CHLORIDE 600; 310; 30; 20 MG/100ML; MG/100ML; MG/100ML; MG/100ML
INJECTION, SOLUTION INTRAVENOUS CONTINUOUS PRN
Status: DISCONTINUED | OUTPATIENT
Start: 2022-09-13 | End: 2022-09-13 | Stop reason: SURG

## 2022-09-13 RX ORDER — SODIUM CHLORIDE 0.9 % (FLUSH) 0.9 %
10 SYRINGE (ML) INJECTION EVERY 12 HOURS SCHEDULED
Status: DISCONTINUED | OUTPATIENT
Start: 2022-09-13 | End: 2022-09-15 | Stop reason: HOSPADM

## 2022-09-13 RX ORDER — METRONIDAZOLE 500 MG/1
500 TABLET ORAL 3 TIMES DAILY
COMMUNITY

## 2022-09-13 RX ORDER — DROPERIDOL 2.5 MG/ML
2.5 INJECTION, SOLUTION INTRAMUSCULAR; INTRAVENOUS ONCE
Status: COMPLETED | OUTPATIENT
Start: 2022-09-13 | End: 2022-09-13

## 2022-09-13 RX ORDER — SUCRALFATE 1 G/1
1 TABLET ORAL
Status: DISCONTINUED | OUTPATIENT
Start: 2022-09-13 | End: 2022-09-15 | Stop reason: HOSPADM

## 2022-09-13 RX ORDER — PROPOFOL 10 MG/ML
VIAL (ML) INTRAVENOUS AS NEEDED
Status: DISCONTINUED | OUTPATIENT
Start: 2022-09-13 | End: 2022-09-13 | Stop reason: SURG

## 2022-09-13 RX ORDER — SODIUM CHLORIDE 0.9 % (FLUSH) 0.9 %
10 SYRINGE (ML) INJECTION AS NEEDED
Status: DISCONTINUED | OUTPATIENT
Start: 2022-09-13 | End: 2022-09-15 | Stop reason: HOSPADM

## 2022-09-13 RX ORDER — MORPHINE SULFATE 2 MG/ML
4 INJECTION, SOLUTION INTRAMUSCULAR; INTRAVENOUS EVERY 4 HOURS PRN
Status: DISCONTINUED | OUTPATIENT
Start: 2022-09-13 | End: 2022-09-14

## 2022-09-13 RX ORDER — PROPOFOL 10 MG/ML
VIAL (ML) INTRAVENOUS CONTINUOUS PRN
Status: DISCONTINUED | OUTPATIENT
Start: 2022-09-13 | End: 2022-09-13 | Stop reason: SURG

## 2022-09-13 RX ORDER — ONDANSETRON 4 MG/1
4 TABLET, FILM COATED ORAL EVERY 6 HOURS PRN
Status: DISCONTINUED | OUTPATIENT
Start: 2022-09-13 | End: 2022-09-15 | Stop reason: HOSPADM

## 2022-09-13 RX ORDER — LEVOFLOXACIN 500 MG/1
500 TABLET, FILM COATED ORAL DAILY
COMMUNITY
End: 2022-09-15

## 2022-09-13 RX ORDER — SODIUM CHLORIDE 9 MG/ML
30 INJECTION, SOLUTION INTRAVENOUS CONTINUOUS PRN
Status: DISCONTINUED | OUTPATIENT
Start: 2022-09-13 | End: 2022-09-15 | Stop reason: HOSPADM

## 2022-09-13 RX ORDER — SODIUM CHLORIDE 9 MG/ML
100 INJECTION, SOLUTION INTRAVENOUS CONTINUOUS
Status: DISCONTINUED | OUTPATIENT
Start: 2022-09-13 | End: 2022-09-14

## 2022-09-13 RX ORDER — ONDANSETRON 2 MG/ML
4 INJECTION INTRAMUSCULAR; INTRAVENOUS EVERY 6 HOURS PRN
Status: DISCONTINUED | OUTPATIENT
Start: 2022-09-13 | End: 2022-09-15 | Stop reason: HOSPADM

## 2022-09-13 RX ORDER — PANTOPRAZOLE SODIUM 40 MG/10ML
80 INJECTION, POWDER, LYOPHILIZED, FOR SOLUTION INTRAVENOUS ONCE
Status: COMPLETED | OUTPATIENT
Start: 2022-09-13 | End: 2022-09-13

## 2022-09-13 RX ORDER — NALOXONE HCL 0.4 MG/ML
0.4 VIAL (ML) INJECTION
Status: DISCONTINUED | OUTPATIENT
Start: 2022-09-13 | End: 2022-09-15 | Stop reason: HOSPADM

## 2022-09-13 RX ORDER — CHOLECALCIFEROL (VITAMIN D3) 125 MCG
5 CAPSULE ORAL NIGHTLY PRN
Status: DISCONTINUED | OUTPATIENT
Start: 2022-09-13 | End: 2022-09-15 | Stop reason: HOSPADM

## 2022-09-13 RX ADMIN — PROPOFOL 150 MG: 10 INJECTION, EMULSION INTRAVENOUS at 18:47

## 2022-09-13 RX ADMIN — Medication 10 ML: at 21:42

## 2022-09-13 RX ADMIN — SODIUM CHLORIDE 30 ML/HR: 9 INJECTION, SOLUTION INTRAVENOUS at 16:07

## 2022-09-13 RX ADMIN — Medication 200 MCG/KG/MIN: at 18:49

## 2022-09-13 RX ADMIN — PANTOPRAZOLE SODIUM 8 MG/HR: 40 INJECTION, POWDER, FOR SOLUTION INTRAVENOUS at 23:42

## 2022-09-13 RX ADMIN — DROPERIDOL 2.5 MG: 2.5 INJECTION, SOLUTION INTRAMUSCULAR; INTRAVENOUS at 09:24

## 2022-09-13 RX ADMIN — SODIUM CHLORIDE, POTASSIUM CHLORIDE, SODIUM LACTATE AND CALCIUM CHLORIDE: 600; 310; 30; 20 INJECTION, SOLUTION INTRAVENOUS at 18:49

## 2022-09-13 RX ADMIN — PANTOPRAZOLE SODIUM 80 MG: 40 INJECTION, POWDER, FOR SOLUTION INTRAVENOUS at 09:26

## 2022-09-13 RX ADMIN — SUCRALFATE 1 G: 1 TABLET ORAL at 21:41

## 2022-09-13 RX ADMIN — IOPAMIDOL 100 ML: 612 INJECTION, SOLUTION INTRAVENOUS at 09:45

## 2022-09-13 RX ADMIN — SODIUM CHLORIDE, POTASSIUM CHLORIDE, SODIUM LACTATE AND CALCIUM CHLORIDE 1000 ML: 600; 310; 30; 20 INJECTION, SOLUTION INTRAVENOUS at 09:29

## 2022-09-13 RX ADMIN — PANTOPRAZOLE SODIUM 8 MG/HR: 40 INJECTION, POWDER, FOR SOLUTION INTRAVENOUS at 09:29

## 2022-09-13 RX ADMIN — SODIUM CHLORIDE 100 ML/HR: 9 INJECTION, SOLUTION INTRAVENOUS at 14:28

## 2022-09-13 NOTE — ANESTHESIA PREPROCEDURE EVALUATION
Anesthesia Evaluation     Patient summary reviewed and Nursing notes reviewed   NPO Solid Status: > 8 hours  NPO Liquid Status: > 8 hours           Airway   Mallampati: II  Neck ROM: full  No difficulty expected  Dental - normal exam     Pulmonary     breath sounds clear to auscultation  Cardiovascular         Neuro/Psych  GI/Hepatic/Renal/Endo    (+)  PUD, GI bleeding ,     Musculoskeletal     Abdominal    Substance History      OB/GYN          Other                        Anesthesia Plan    ASA 2     MAC     intravenous induction           CODE STATUS:    Code Status (Patient has no pulse and is not breathing): CPR (Attempt to Resuscitate)  Medical Interventions (Patient has pulse or is breathing): Full Support

## 2022-09-13 NOTE — PROGRESS NOTES
Clinical Pharmacy Services: Medication History    Adam Odom is a 32 y.o. male presenting to [facility] for   Chief Complaint   Patient presents with   • Abdominal Pain   • Black or Bloody Stool       He  has no past medical history on file.    Allergies as of 09/13/2022 - Reviewed 09/13/2022   Allergen Reaction Noted   • Benadryl [diphenhydramine hcl (sleep)]  01/05/2018       Medication information was obtained from: Pharmacy  Pharmacy and Phone Number:   [id number] - [place], [place] - [facility] [address] AT [facility] - [phone]  - [phone]   [address] [facility] [address]  Phone: [phone] Fax: [phone]    [facility] [place]  [address] [facility] [address]  Phone: [phone] Fax: [phone]        Prior to Admission Medications     Prescriptions Last Dose Informant Patient Reported? Taking?    amoxicillin-clavulanate (AUGMENTIN) 875-125 MG per tablet  Pharmacy No Yes    Take 1 tablet by mouth Every 12 (Twelve) Hours for 14 doses. Indications: Infection Within the Abdomen    levoFLOXacin (LEVAQUIN) 500 MG tablet  Pharmacy Yes Yes    Take 500 mg by mouth Daily. Ends 9/16/22    metroNIDAZOLE (FLAGYL) 500 MG tablet  Pharmacy Yes Yes    Take 500 mg by mouth 3 (Three) Times a Day. Ends 9/16/22    ondansetron ODT (Zofran ODT) 4 MG disintegrating tablet  Pharmacy No Yes    Place 1 tablet on the tongue Every 8 (Eight) Hours As Needed for Nausea or Vomiting for up to 5 days.            Medication notes: Patient picked up all 4 medications on 9/10/22.    This medication list is complete to the best of my knowledge as of 9/13/2022    Please call if questions.    Dwain Schneider  Medication History Technician  [phone]    9/13/2022 12:47 EDT

## 2022-09-13 NOTE — H&P
Patient Name:  Adam Odom  YOB: 1990  MRN:  [id number]  Admit Date:  2022  Patient Care Team:  Provider, No Known as PCP - General      Subjective   History Present Illness     Chief Complaint   Patient presents with   • Abdominal Pain   • Black or Bloody Stool         History of Present Illness  Mr. Odom is a 32 y.o. with no known past medical history presenting with melena since yesterday.  Has had 8 out of 10 epigastric pain for approximately 1 week that he describes as pressure.  Took some NSAIDs, but did not help his pain.  Yesterday afternoon he noticed he started having black stool.  Hemoccult positive in the ED.  States he has had 2-3 of these bowel movements since then.  Seen here over the weekend for the abdominal pain, CT showed possible signs of early appendicitis.  Surgery saw patient, and discussed antibiotic treatment versus laparoscopic appendectomy and sent patient home on Augmentin for 7 days.  Patient denies any nausea, vomiting, fever, chills, bright red blood per rectum.      .Review of Systems   Constitutional: Negative for chills and fever.   HENT: Negative for rhinorrhea and sneezing.    Respiratory: Negative for cough and shortness of breath.    Cardiovascular: Negative for chest pain and leg swelling.   Gastrointestinal: Positive for abdominal pain. Negative for constipation, diarrhea and nausea.        Positive for melena.   Genitourinary: Negative for dysuria and frequency.   Musculoskeletal: Negative for back pain and myalgias.   Skin: Negative for rash and wound.   Neurological: Negative for dizziness, syncope and light-headedness.        Personal History     History reviewed. No pertinent past medical history.  History reviewed. No pertinent surgical history.  History reviewed. No pertinent family history.  Social History     Tobacco Use   • Smoking status: Former Smoker     Types: Electronic Cigarette     Quit date: 2017     Years since quittin.0    • Smokeless tobacco: Never Used   Substance Use Topics   • Alcohol use: Yes     Comment: socially     No current facility-administered medications on file prior to encounter.     Current Outpatient Medications on File Prior to Encounter   Medication Sig Dispense Refill   • amoxicillin-clavulanate (AUGMENTIN) 875-125 MG per tablet Take 1 tablet by mouth Every 12 (Twelve) Hours for 14 doses. Indications: Infection Within the Abdomen 14 tablet 0   • ondansetron ODT (Zofran ODT) 4 MG disintegrating tablet Place 1 tablet on the tongue Every 8 (Eight) Hours As Needed for Nausea or Vomiting for up to 5 days. 15 tablet 0     Allergies   Allergen Reactions   • Benadryl [Diphenhydramine Hcl (Sleep)]        Objective    Objective     Vital Signs  Temp:  [96.4 °F (35.8 °C)] 96.4 °F (35.8 °C)  Heart Rate:  [] 79  Resp:  [18] 18  BP: (110-138)/(70-93) 117/74  SpO2:  [96 %-100 %] 100 %  on   ;   Device (Oxygen Therapy): room air  Body mass index is 24.39 kg/m².    Physical Exam  Constitutional:       Appearance: He is normal weight.   HENT:      Head: Normocephalic and atraumatic.   Eyes:      Extraocular Movements: Extraocular movements intact.      Conjunctiva/sclera: Conjunctivae normal.   Cardiovascular:      Rate and Rhythm: Normal rate and regular rhythm.      Pulses: Normal pulses.      Heart sounds: No murmur heard.    No gallop.   Pulmonary:      Effort: Pulmonary effort is normal. No respiratory distress.      Breath sounds: No wheezing.   Abdominal:      General: Abdomen is flat. Bowel sounds are normal. There is no distension.      Palpations: Abdomen is soft.      Tenderness: There is abdominal tenderness. There is no guarding or rebound.   Musculoskeletal:         General: No swelling or deformity.   Skin:     General: Skin is warm and dry.   Neurological:      General: No focal deficit present.      Mental Status: He is alert. Mental status is at baseline.   Psychiatric:         Mood and Affect: Mood normal.          Thought Content: Thought content normal.         Judgment: Judgment normal.         Results Review:  I reviewed the patient's new clinical results.  I reviewed the patient's new imaging results and agree with the interpretation.  I reviewed the patient's other test results and agree with the interpretation  I personally viewed and interpreted the patient's EKG/Telemetry data  Discussed with ED provider.    Lab Results (last 24 hours)     Procedure Component Value Units Date/Time    CBC & Differential [[id number]]  (Abnormal) Collected: 09/13/22 0759    Specimen: Blood Updated: 09/13/22 0817    Narrative:      The following orders were created for panel order CBC & Differential.  Procedure                               Abnormality         Status                     ---------                               -----------         ------                     CBC Auto Differential[[id number]]        Abnormal            Final result                 Please view results for these tests on the individual orders.    Comprehensive Metabolic Panel [[id number]]  (Abnormal) Collected: 09/13/22 0759    Specimen: Blood Updated: 09/13/22 0838     Glucose 111 mg/dL      BUN 19 mg/dL      Creatinine 0.84 mg/dL      Sodium 140 mmol/L      Potassium 3.9 mmol/L      Chloride 106 mmol/L      CO2 27.0 mmol/L      Calcium 8.8 mg/dL      Total Protein 6.2 g/dL      Albumin 3.70 g/dL      ALT (SGPT) 14 U/L      AST (SGOT) 20 U/L      Alkaline Phosphatase 95 U/L      Total Bilirubin <0.2 mg/dL      Globulin 2.5 gm/dL      A/G Ratio 1.5 g/dL      BUN/Creatinine Ratio 22.6     Anion Gap 7.0 mmol/L      eGFR 117.4 mL/min/1.73      Comment: National Kidney Foundation and American Society of Nephrology (ASN) Task Force recommended calculation based on the Chronic Kidney Disease Epidemiology Collaboration (CKD-EPI) equation refit without adjustment for race.       Narrative:      GFR Normal >60  Chronic Kidney Disease <60  Kidney Failure <15       Lipase [[id number]]  (Normal) Collected: 09/13/22 0759    Specimen: Blood Updated: 09/13/22 0834     Lipase 27 U/L     Troponin [[id number]]  (Normal) Collected: 09/13/22 0759    Specimen: Blood Updated: 09/13/22 0859     Troponin T <0.010 ng/mL     Narrative:      Troponin T Reference Range:  <= 0.03 ng/mL-   Negative for AMI  >0.03 ng/mL-     Abnormal for myocardial necrosis.  Clinicians would have to utilize clinical acumen, EKG, Troponin and serial changes to determine if it is an Acute Myocardial Infarction or myocardial injury due to an underlying chronic condition.       Results may be falsely decreased if patient taking Biotin.      CBC Auto Differential [[id number]]  (Abnormal) Collected: 09/13/22 0759    Specimen: Blood Updated: 09/13/22 0817     WBC 6.31 10*3/mm3      RBC 3.60 10*6/mm3      Hemoglobin 10.6 g/dL      Hematocrit 32.3 %      MCV 89.7 fL      MCH 29.4 pg      MCHC 32.8 g/dL      RDW 12.6 %      RDW-SD 41.5 fl      MPV 10.3 fL      Platelets 260 10*3/mm3      Neutrophil % 52.2 %      Lymphocyte % 33.4 %      Monocyte % 11.9 %      Eosinophil % 1.3 %      Basophil % 1.0 %      Immature Grans % 0.2 %      Neutrophils, Absolute 3.30 10*3/mm3      Lymphocytes, Absolute 2.11 10*3/mm3      Monocytes, Absolute 0.75 10*3/mm3      Eosinophils, Absolute 0.08 10*3/mm3      Basophils, Absolute 0.06 10*3/mm3      Immature Grans, Absolute 0.01 10*3/mm3      nRBC 0.0 /100 WBC     Urinalysis With Microscopic If Indicated (No Culture) - Urine, Clean Catch [[id number]]  (Abnormal) Collected: 09/13/22 0819    Specimen: Urine, Clean Catch Updated: 09/13/22 0829     Color, UA Yellow     Appearance, UA Cloudy     pH, UA 6.0     Specific Gravity, UA 1.022     Glucose, UA Negative     Ketones, UA Negative     Bilirubin, UA Negative     Blood, UA Negative     Protein, UA Negative     Leuk Esterase, UA Small (1+)     Nitrite, UA Negative     Urobilinogen, UA 0.2 E.U./dL    Urinalysis, Microscopic Only - Urine, Clean Catch  [[id number]] Collected: 09/13/22 0819    Specimen: Urine, Clean Catch Updated: 09/13/22 0831     RBC, UA 0-2 /HPF      WBC, UA 0-2 /HPF      Bacteria, UA None Seen /HPF      Squamous Epithelial Cells, UA 0-2 /HPF      Hyaline Casts, UA None Seen /LPF      Methodology Automated Microscopy          Imaging Results (Last 24 Hours)     Procedure Component Value Units Date/Time    CT Abdomen Pelvis With Contrast [[id number]] Collected: 09/13/22 1027     Updated: 09/13/22 1028    Narrative:      CT ABDOMEN AND PELVIS WITH CONTRAST     HISTORY: 32-year-old male with right lower quadrant abdominal pain.     TECHNIQUE: Axial CT images of the abdomen and pelvis were obtained  following administration of intravenous contrast. The patient was not  given oral contrast Coronal and sagittal reformats were obtained.     COMPARISON: 09/09/2022     FINDINGS: The appendix remains mildly distended with suggestion of mild  wall thickening measuring up to 8 mm today, in comparison to 7 mm  previously. Again there is no periappendiceal stranding or fluid  present. Numerous subcentimeter mesenteric lymph nodes within the right  lower quadrant. There is no evidence of bowel obstruction. There is  again low-density wall thickening involving the gastric antropyloric  region and the first portion of the stomach with mucosal  hyperenhancement that may suggest gastritis.     The liver demonstrates normal attenuation. There is an area of focal  fatty infiltration adjacent to the falciform. The spleen is normal. The  pancreas, gallbladder, bilateral adrenal glands and kidneys are normal.  There is no pathological retroperitoneal lymphadenopathy. No ascites.       Impression:      1. The appendix is again markedly thickened measuring up to 8 mm today  compared to 7 mm previously. No periappendiceal stranding or fluid is  seen. Again early or mild appendicitis is a consideration.  2. Low-density wall thickening along the gastric antropyloric region  and  the duodenal bulb. This may be related to gastritis/ulcer disease.     These findings were discussed with Dr. Johnson by telephone.     Radiation dose reduction techniques were utilized, including automated  exposure control and exposure modulation based on body size.          XR Chest 2 View [[id number]] Collected: 09/13/22 0857     Updated: 09/13/22 0901    Narrative:      XR CHEST 2 VW-  09/13/2022     HISTORY: Upper abdominal pain.     Heart size is within normal limits. Lungs appear free of acute  infiltrates. Bones and soft tissues are unremarkable.       Impression:      1. No acute process.     This report was finalized on 9/13/2022 8:58 AM by Dr. Jesus Manuel Mcnamara M.D.                 ECG 12 Lead   Final Result   HEART RATE= 81  bpm   RR Interval= 741  ms   WA Interval= 163  ms   P Horizontal Axis= -30  deg   P Front Axis= 61  deg   QRSD Interval= 86  ms   QT Interval= 349  ms   QRS Axis= 74  deg   T Wave Axis= 40  deg   - NORMAL ECG -   Sinus rhythm   No Prior Tracing for Comparison   Electronically Signed By: Ben Wallace ([facility]) 13-Sep-2022 08:31:59   Date and Time of Study: 2022-09-13 07:47:00           Assessment/Plan     Active Hospital Problems    Diagnosis  POA   • **Gastrointestinal hemorrhage associated with gastric ulcer [K25.4]  Unknown   • Peptic ulcer [K27.9]  Yes      Resolved Hospital Problems   No resolved problems to display.     Acute blood loss anemia from upper GI bleed  Abdominal pain  -Concern for ulcer versus early onset appendicitis  -Hemoglobin down to 10.6 today, was 14.3 a few days ago  -CT abdomen with thickened appendix up to 8 mm.  No periappendiceal stranding or fluid seen.  Low-density wall thickening of the gastric region and duodenal bulb.  -We will transfuse for hemoglobin less than 7.0, will check H&H every 12 hours  -Iron studies pending.  -Continue PPI drip, IV fluids, IV pain meds  -Surgery consulted, plan to do EGD later today.  Patient n.p.o.  Discussed with   Mane.  If no ulcer plan to do laparoscopic appendectomy.    I discussed the patient's findings and my recommendations with patient, family, consulting provider and ED provider.    VTE Prophylaxis - None due to active bleeding, Riccardo score 0..  Code Status - Full code.       Kalpesh Brown MD  [facility]  09/13/22  12:15 EDT

## 2022-09-13 NOTE — ED NOTES
Nursing report ED to floor  Adam Odom  32 y.o.  male    HPI :   Chief Complaint   Patient presents with   • Abdominal Pain   • Black or Bloody Stool       Admitting doctor:   Kalpesh Brown MD    Admitting diagnosis:   The primary encounter diagnosis was Gastrointestinal hemorrhage associated with gastric ulcer. Diagnoses of Peptic ulcer and ABLA (acute blood loss anemia) were also pertinent to this visit.    Code status:   Current Code Status     Date Active Code Status Order ID Comments User Context       9/13/2022 1213 CPR (Attempt to Resuscitate) [id number]  Kalpesh Brown MD ED     Advance Care Planning Activity      Questions for Current Code Status     Question Answer    Code Status (Patient has no pulse and is not breathing) CPR (Attempt to Resuscitate)    Medical Interventions (Patient has pulse or is breathing) Full Support          Allergies:   Benadryl [diphenhydramine hcl (sleep)]    Intake and Output    Intake/Output Summary (Last 24 hours) at 9/13/2022 1423  Last data filed at 9/13/2022 1139  Gross per 24 hour   Intake 1000 ml   Output --   Net 1000 ml       Weight:       09/13/22  0747   Weight: 77.1 kg (170 lb)       Most recent vitals:   Vitals:    09/13/22 1001 09/13/22 1030 09/13/22 1031 09/13/22 1201   BP: 110/70  117/74 119/73   BP Location:       Patient Position:       Pulse: 73 78 79 71   Resp:       Temp:       TempSrc:       SpO2: 98% 100% 100% 100%   Weight:       Height:           Active LDAs/IV Access:   Lines, Drains & Airways     Active LDAs     Name Placement date Placement time Site Days    Peripheral IV 09/13/22 0800 Right Antecubital 09/13/22  0800  Antecubital  less than 1                Labs (abnormal labs have a star):   Labs Reviewed   COMPREHENSIVE METABOLIC PANEL - Abnormal; Notable for the following components:       Result Value    Glucose 111 (*)     All other components within normal limits    Narrative:     GFR Normal >60  Chronic Kidney Disease <60  Kidney Failure  <15     URINALYSIS W/ MICROSCOPIC IF INDICATED (NO CULTURE) - Abnormal; Notable for the following components:    Appearance, UA Cloudy (*)     Leuk Esterase, UA Small (1+) (*)     All other components within normal limits   CBC WITH AUTO DIFFERENTIAL - Abnormal; Notable for the following components:    RBC 3.60 (*)     Hemoglobin 10.6 (*)     Hematocrit 32.3 (*)     All other components within normal limits   IRON PROFILE - Abnormal; Notable for the following components:    Iron 41 (*)     Iron Saturation 12 (*)     All other components within normal limits   LIPASE - Normal   TROPONIN (IN-HOUSE) - Normal    Narrative:     Troponin T Reference Range:  <= 0.03 ng/mL-   Negative for AMI  >0.03 ng/mL-     Abnormal for myocardial necrosis.  Clinicians would have to utilize clinical acumen, EKG, Troponin and serial changes to determine if it is an Acute Myocardial Infarction or myocardial injury due to an underlying chronic condition.       Results may be falsely decreased if patient taking Biotin.     FERRITIN - Normal    Narrative:     Results may be falsely decreased if patient taking Biotin.     RAINBOW DRAW    Narrative:     The following orders were created for panel order Hanna Draw.  Procedure                               Abnormality         Status                     ---------                               -----------         ------                     Green Top (Gel)[[id number]]                                  Final result               Lavender Top[[id number]]                                     Final result               Gold Top - SST[[id number]]                                   Final result               Light Blue Top[[id number]]                                   Final result                 Please view results for these tests on the individual orders.   URINALYSIS, MICROSCOPIC ONLY   TYPE AND SCREEN   CBC AND DIFFERENTIAL    Narrative:     The following orders were created for panel order CBC &  Differential.  Procedure                               Abnormality         Status                     ---------                               -----------         ------                     CBC Auto Differential[[id number]]        Abnormal            Final result                 Please view results for these tests on the individual orders.   GREEN TOP   LAVENDER TOP   GOLD TOP - SST   LIGHT BLUE TOP       EKG:   ECG 12 Lead   Final Result   HEART RATE= 81  bpm   RR Interval= 741  ms   MI Interval= 163  ms   P Horizontal Axis= -30  deg   P Front Axis= 61  deg   QRSD Interval= 86  ms   QT Interval= 349  ms   QRS Axis= 74  deg   T Wave Axis= 40  deg   - NORMAL ECG -   Sinus rhythm   No Prior Tracing for Comparison   Electronically Signed By: Ben Wallace ([facility]) 13-Sep-2022 08:31:59   Date and Time of Study: 2022-09-13 07:47:00          Meds given in ED:   Medications   sodium chloride 0.9 % flush 10 mL (has no administration in time range)   pantoprazole (PROTONIX) 40 mg in 100 mL NS (VTB) (8 mg/hr Intravenous New Bag 9/13/22 0929)   sodium chloride 0.9 % infusion (has no administration in time range)   lactated ringers bolus 1,000 mL (0 mL Intravenous Stopped 9/13/22 1139)   droperidol (INAPSINE) injection 2.5 mg (2.5 mg Intravenous Given 9/13/22 0924)   pantoprazole (PROTONIX) injection 80 mg (80 mg Intravenous Given 9/13/22 0926)   iopamidol (ISOVUE-300) 61 % injection 100 mL (100 mL Intravenous Given by Other 9/13/22 0945)       Imaging results:  XR Chest 2 View    Result Date: 9/13/2022  1. No acute process.  This report was finalized on 9/13/2022 8:58 AM by Dr. Jesus Manuel Mcnamara M.D.      CT Abdomen Pelvis With Contrast    Result Date: 9/13/2022  1. The appendix is again markedly thickened measuring up to 8 mm today compared to 7 mm previously. No periappendiceal stranding or fluid is seen. Again early or mild appendicitis is a consideration. 2. Low-density wall thickening along the gastric antropyloric region  and the duodenal bulb. This may be related to gastritis/ulcer disease.  These findings were discussed with Dr. Johnson by telephone.  Radiation dose reduction techniques were utilized, including automated exposure control and exposure modulation based on body size.         Ambulatory status:   - Up ad kaia    Social issues:   Social History     Socioeconomic History   • Marital status: Single   Tobacco Use   • Smoking status: Former Smoker     Types: Electronic Cigarette     Quit date: 2017     Years since quittin.0   • Smokeless tobacco: Never Used   Substance and Sexual Activity   • Alcohol use: Yes     Comment: socially       NIH Stroke Scale:        Nursing report ED to floor:

## 2022-09-13 NOTE — ED TRIAGE NOTES
Pt to ER from home via PV with c/o RLQ abd pain and watery, tarry stools. Pt states he was seen here recently for the same and the pain had resolved but returned this morning, worse than before. Pt also reports nausea.       Pt masked in triage, staff in appropriate ppe.

## 2022-09-14 LAB
ANION GAP SERPL CALCULATED.3IONS-SCNC: 8 MMOL/L (ref 5–15)
BASOPHILS # BLD AUTO: 0.05 10*3/MM3 (ref 0–0.2)
BASOPHILS NFR BLD AUTO: 0.9 % (ref 0–1.5)
BUN SERPL-MCNC: 7 MG/DL (ref 6–20)
BUN/CREAT SERPL: 9.1 (ref 7–25)
CALCIUM SPEC-SCNC: 8.8 MG/DL (ref 8.6–10.5)
CHLORIDE SERPL-SCNC: 107 MMOL/L (ref 98–107)
CO2 SERPL-SCNC: 25 MMOL/L (ref 22–29)
CREAT SERPL-MCNC: 0.77 MG/DL (ref 0.76–1.27)
DEPRECATED RDW RBC AUTO: 41.4 FL (ref 37–54)
EGFRCR SERPLBLD CKD-EPI 2021: 120.5 ML/MIN/1.73
EOSINOPHIL # BLD AUTO: 0.09 10*3/MM3 (ref 0–0.4)
EOSINOPHIL NFR BLD AUTO: 1.6 % (ref 0.3–6.2)
ERYTHROCYTE [DISTWIDTH] IN BLOOD BY AUTOMATED COUNT: 12.6 % (ref 12.3–15.4)
GLUCOSE SERPL-MCNC: 91 MG/DL (ref 65–99)
HCT VFR BLD AUTO: 28.8 % (ref 37.5–51)
HGB BLD-MCNC: 9.4 G/DL (ref 13–17.7)
IMM GRANULOCYTES # BLD AUTO: 0.01 10*3/MM3 (ref 0–0.05)
IMM GRANULOCYTES NFR BLD AUTO: 0.2 % (ref 0–0.5)
LYMPHOCYTES # BLD AUTO: 2.85 10*3/MM3 (ref 0.7–3.1)
LYMPHOCYTES NFR BLD AUTO: 49.7 % (ref 19.6–45.3)
MCH RBC QN AUTO: 29.6 PG (ref 26.6–33)
MCHC RBC AUTO-ENTMCNC: 32.6 G/DL (ref 31.5–35.7)
MCV RBC AUTO: 90.6 FL (ref 79–97)
MONOCYTES # BLD AUTO: 0.49 10*3/MM3 (ref 0.1–0.9)
MONOCYTES NFR BLD AUTO: 8.5 % (ref 5–12)
NEUTROPHILS NFR BLD AUTO: 2.25 10*3/MM3 (ref 1.7–7)
NEUTROPHILS NFR BLD AUTO: 39.1 % (ref 42.7–76)
NRBC BLD AUTO-RTO: 0 /100 WBC (ref 0–0.2)
PLATELET # BLD AUTO: 237 10*3/MM3 (ref 140–450)
PMV BLD AUTO: 10.8 FL (ref 6–12)
POTASSIUM SERPL-SCNC: 4 MMOL/L (ref 3.5–5.2)
RBC # BLD AUTO: 3.18 10*6/MM3 (ref 4.14–5.8)
SODIUM SERPL-SCNC: 140 MMOL/L (ref 136–145)
WBC NRBC COR # BLD: 5.74 10*3/MM3 (ref 3.4–10.8)

## 2022-09-14 PROCEDURE — 25010000002 MORPHINE PER 10 MG: Performed by: STUDENT IN AN ORGANIZED HEALTH CARE EDUCATION/TRAINING PROGRAM

## 2022-09-14 PROCEDURE — G0378 HOSPITAL OBSERVATION PER HR: HCPCS

## 2022-09-14 PROCEDURE — 99213 OFFICE O/P EST LOW 20 MIN: CPT | Performed by: SURGERY

## 2022-09-14 PROCEDURE — 85025 COMPLETE CBC W/AUTO DIFF WBC: CPT | Performed by: STUDENT IN AN ORGANIZED HEALTH CARE EDUCATION/TRAINING PROGRAM

## 2022-09-14 PROCEDURE — 80048 BASIC METABOLIC PNL TOTAL CA: CPT | Performed by: STUDENT IN AN ORGANIZED HEALTH CARE EDUCATION/TRAINING PROGRAM

## 2022-09-14 RX ORDER — PANTOPRAZOLE SODIUM 40 MG/10ML
40 INJECTION, POWDER, LYOPHILIZED, FOR SOLUTION INTRAVENOUS
Status: DISCONTINUED | OUTPATIENT
Start: 2022-09-14 | End: 2022-09-15 | Stop reason: HOSPADM

## 2022-09-14 RX ORDER — HYDROCODONE BITARTRATE AND ACETAMINOPHEN 7.5; 325 MG/1; MG/1
1 TABLET ORAL EVERY 4 HOURS PRN
Status: DISCONTINUED | OUTPATIENT
Start: 2022-09-14 | End: 2022-09-15 | Stop reason: HOSPADM

## 2022-09-14 RX ORDER — PANTOPRAZOLE SODIUM 40 MG/1
40 TABLET, DELAYED RELEASE ORAL
Status: DISCONTINUED | OUTPATIENT
Start: 2022-09-14 | End: 2022-09-14

## 2022-09-14 RX ADMIN — PANTOPRAZOLE SODIUM 40 MG: 40 INJECTION, POWDER, FOR SOLUTION INTRAVENOUS at 18:57

## 2022-09-14 RX ADMIN — Medication 1 APPLICATION: at 12:52

## 2022-09-14 RX ADMIN — PANTOPRAZOLE SODIUM 40 MG: 40 TABLET, DELAYED RELEASE ORAL at 11:07

## 2022-09-14 RX ADMIN — PANTOPRAZOLE SODIUM 8 MG/HR: 40 INJECTION, POWDER, FOR SOLUTION INTRAVENOUS at 04:20

## 2022-09-14 RX ADMIN — MORPHINE SULFATE 4 MG: 2 INJECTION, SOLUTION INTRAMUSCULAR; INTRAVENOUS at 09:37

## 2022-09-14 RX ADMIN — MORPHINE SULFATE 4 MG: 2 INJECTION, SOLUTION INTRAMUSCULAR; INTRAVENOUS at 03:41

## 2022-09-14 RX ADMIN — SODIUM CHLORIDE 100 ML/HR: 9 INJECTION, SOLUTION INTRAVENOUS at 04:21

## 2022-09-14 RX ADMIN — SUCRALFATE 1 G: 1 TABLET ORAL at 06:30

## 2022-09-14 RX ADMIN — Medication 10 ML: at 20:10

## 2022-09-14 RX ADMIN — SUCRALFATE 1 G: 1 TABLET ORAL at 20:10

## 2022-09-14 RX ADMIN — HYDROCODONE BITARTRATE AND ACETAMINOPHEN 1 TABLET: 7.5; 325 TABLET ORAL at 11:07

## 2022-09-14 NOTE — PLAN OF CARE
Goal Outcome Evaluation:    A&OX4, calm and cooperative. POD 1, up ad kaia, RA. PT c/o severe pain in R upper tooth. Oral gel/Norco/Morphine given. Mouth rinsed with peroxide. Consulted Oral surgery, no MD able to see inpatient will set up outpatient. Regular diet, tolerating well. VSS, no distress  noted currently. Will cont to monitor throughout remainder of shift,.

## 2022-09-14 NOTE — PROGRESS NOTES
"Progress Note    Chief Complaint   Patient presents with   • Abdominal Pain   • Black or Bloody Stool       S: There were no events overnight.  Patient is complaining of tooth pain.  Denies any abdominal pain.  Had a dark bowel movement yesterday but had a normal bowel movement today.    O:/53 (BP Location: Right arm, Patient Position: Lying)   Pulse 78   Temp 97.8 °F (36.6 °C) (Oral)   Resp 18   Ht 177.8 cm (70\")   Wt 77.1 kg (170 lb)   SpO2 98%   BMI 24.39 kg/m²   Body mass index is 24.39 kg/m².    I/O last 3 completed shifts:  In: 1675 [P.O.:375; I.V.:300; IV Piggyback:1000]  Out: -   No intake/output data recorded.      GENERAL:alert, well appearing, and in no distress and oriented to person, place, and time  HEENT: normochephalic, atraumatic, moist mucus membranes, clear sclerae   CHEST: clear to auscultation, no wheezes, rales or rhonchi, symmetric air entry  CARDIAC: regular rate and rhythm    ABDOMEN: soft, nontender, nondistended, no masses or organomegaly  EXTREMITIES: no cyanosis, clubbing or edema    SKIN: Warm and moist, no rashes    Results from last 7 days   Lab Units 09/14/22  0717   WBC 10*3/mm3 5.74   HEMOGLOBIN g/dL 9.4*   HEMATOCRIT % 28.8*   PLATELETS 10*3/mm3 237     Results from last 7 days   Lab Units 09/14/22  0717 09/13/22  2045 09/13/22  0759 09/10/22  0352 09/09/22  0644   SODIUM mmol/L 140 138 140   < > 137   POTASSIUM mmol/L 4.0 4.1 3.9   < > 3.5   CHLORIDE mmol/L 107 105 106   < > 100   CO2 mmol/L 25.0 23.8 27.0   < > 28.9   BUN mg/dL 7 11 19   < > 8   CREATININE mg/dL 0.77 0.68* 0.84   < > 0.88   CALCIUM mg/dL 8.8 8.8 8.8   < > 9.5   BILIRUBIN mg/dL  --   --  <0.2  --  0.3   ALK PHOS U/L  --   --  95  --  123*   ALT (SGPT) U/L  --   --  14  --  20   AST (SGOT) U/L  --   --  20  --  21   GLUCOSE mg/dL 91 111* 111*   < > 98    < > = values in this interval not displayed.       ROS:   Constitutional: denies any weight changes, fatigue or weakness.  Cardiovascular: denies " chest pain, palpitations, edemas.  Respiratory: denies cough, sputum, SOB.  Gastrointestinal: denies N&V, abd pain, diarrhea, constipation.  Genitourinary: denies dysuria, frequency.    DIET: Full liquid diet    A/P: 32 y.o. male with upper GI bleed secondary to duodenal ulcer.  Patient tells me today that he has been taking Aleve 6-8 ibuprofen daily for the past several weeks due to tooth pain.  We try to consult oral surgery but they do not come to the hospital.  His hemoglobin dropped slightly today.  Discussed with the patient about further step.  He should keep taking Protonix 40 mg twice daily, continue Carafate.  We will advance diet as tolerated.  Patient understand that he should not take ibuprofen or any NSAIDs for the next 2 months.  He should also avoid caffeinated beverages for at least 2 months.    -Hopefully discharge home tomorrow        Malick Mane MD  General, Minimally Invasive and Endoscopic Surgery  [facility]    [address], [address]  [place], [place], [address]  P: [phone]  F: [phone]

## 2022-09-14 NOTE — PROGRESS NOTES
"    DAILY PROGRESS NOTE  [facility]    Patient Identification:  Name: Adam Odom  Age: 32 y.o.  Sex: male  :  1990  MRN: [id number]         Primary Care Physician: Provider, No Known    Subjective:  Interval History: Main complaint this a.m. is some tooth pain of left upper molar.  This is pre-existing as he had chipped upper tooth in the past.  Denies any fever or chills.  Tolerating clear liquids.  Not really complaining of much abdominal pain today.  Denies any chest pain or troubles breathing    Objective: Youthful, seems uncomfortable from dental discomfort but otherwise ambulating independently and sipping water    Scheduled Meds:pantoprazole, 40 mg, Oral, BID AC  sodium chloride, 10 mL, Intravenous, Q12H  sucralfate, 1 g, Oral, 4x Daily AC & at Bedtime      Continuous Infusions:sodium chloride, 30 mL/hr, Last Rate: Stopped (22 1920)        Vital signs in last 24 hours:  Temp:  [97 °F (36.1 °C)] 97 °F (36.1 °C)  Heart Rate:  [] 83  Resp:  [16-18] 18  BP: (109-122)/(68-89) 110/68    Intake/Output:    Intake/Output Summary (Last 24 hours) at 2022 1015  Last data filed at 2022 0404  Gross per 24 hour   Intake 1675 ml   Output --   Net 1675 ml       Exam:  /68 (BP Location: Right arm, Patient Position: Lying)   Pulse 83   Temp 97 °F (36.1 °C) (Oral)   Resp 18   Ht 177.8 cm (70\")   Wt 77.1 kg (170 lb)   SpO2 100%   BMI 24.39 kg/m²     General Appearance:    Alert, cooperative, nontoxic, AAOx3                          Head:    Normocephalic, without obvious abnormality, atraumatic                           Eyes:    Conjunctivae/corneas clear, EOM's intact, both eyes                         Throat:   No obvious signs of dental abscess at this time; oral mucosa pink and moist                           Neck:   Supple, no rigidity or JVD                         Lungs:    Clear to auscultation bilaterally, respirations unlabored                 Chest Wall:    " Tattoo                           Heart:    Regular rate and rhythm, S1 and S2 normal                  Abdomen:     Soft, nontender, bowel sounds active                  Extremities: Moving all with baseline strength, no cyanosis or edema                        Pulses:   Pulses palpable in all extremities                            Skin:   Skin is warm and dry                  Neurologic:   CNII-XII intact     Data Review:  Labs in chart were reviewed.    Assessment:  Active Hospital Problems    Diagnosis  POA   • Peptic ulcer [K27.9]  Yes   • Duodenal ulcer [K26.9]  Unknown      Resolved Hospital Problems    Diagnosis Date Resolved POA   • **Gastrointestinal hemorrhage associated with gastric ulcer [K25.4] 09/13/2022 Unknown       Plan:    Acute blood loss anemia secondary to large duodenal ulcer   -Tolerating clears -advanced to FLD -further dietary recommendations per surgery   -IV PPI not currently running so went ahead and ordered twice daily p.o. PPI with Carafate already ordered   -Acute blood loss anemia -H&H ordered for a.m.    Tooth pain -left upper molar -advised patient to set up dentistry appointment as outpatient.  No current signs of overt infectious process going on or abscess   -Ordered Orajel with as needed Norco      Disposition ended up discussing the case with Dr. Mane, he will assume care as attending physician going forward and will transition off [facility] service.      Jeff Baron MD  9/14/2022  10:15 EDT

## 2022-09-14 NOTE — PLAN OF CARE
Problem: Adult Inpatient Plan of Care  Goal: Plan of Care Review  Outcome: Ongoing, Progressing  Flowsheets  Taken 9/13/2022 2010 by Jenifer Martin, RN  Progress: no change  Taken 9/13/2022 1555 by Rocco Hernandez RN  Plan of Care Reviewed With: patient  Goal: Patient-Specific Goal (Individualized)  Outcome: Ongoing, Progressing  Goal: Absence of Hospital-Acquired Illness or Injury  Outcome: Ongoing, Progressing  Goal: Optimal Comfort and Wellbeing  Outcome: Ongoing, Progressing  Goal: Readiness for Transition of Care  Outcome: Ongoing, Progressing  Intervention: Mutually Develop Transition Plan  Recent Flowsheet Documentation  Taken 9/13/2022 2006 by Jenifer Martin, RN  Transportation Anticipated: family or friend will provide  Patient/Family Anticipated Services at Transition: none  Patient/Family Anticipates Transition to: home with family  Taken 9/13/2022 2005 by Jenifer Martin, RN  Equipment Currently Used at Home: none   Goal Outcome Evaluation:           Progress: no change   VSS, room air, A&Ox4. Admission documentation complete. Morphine given for pain.

## 2022-09-14 NOTE — CASE MANAGEMENT/SOCIAL WORK
Discharge Planning Assessment  [facility]     Patient Name: Adam Odom  MRN: [id number]  Today's Date: 9/14/2022    Admit Date: 9/13/2022     Discharge Needs Assessment     Row Name 09/14/22 1039       Living Environment    People in Home significant other    Current Living Arrangements home    Primary Care Provided by self    Provides Primary Care For no one    Family Caregiver if Needed significant other;friend(s)    Family Caregiver Names Valeriy Tijerina [phone]    Quality of Family Relationships helpful;involved    Able to Return to Prior Arrangements yes       Resource/Environmental Concerns    Resource/Environmental Concerns none    Transportation Concerns none       Transition Planning    Patient/Family Anticipates Transition to home with family    Patient/Family Anticipated Services at Transition none    Transportation Anticipated family or friend will provide       Discharge Needs Assessment    Readmission Within the Last 30 Days no previous admission in last 30 days    Equipment Currently Used at Home none    Concerns to be Addressed no discharge needs identified;denies needs/concerns at this time    Anticipated Changes Related to Illness none    Equipment Needed After Discharge none               Discharge Plan     Row Name 09/14/22 1040       Plan    Plan Return home with S.O.    Patient/Family in Agreement with Plan yes    Plan Comments Met with patient at bedside, introduced self and explained CCP role. Verified facesheet. Patient stated he did not have a PCP. CCP provided [facility] PCP list and offered to assist with making an apt, however patient stated he preferred to make apt himself. Patient lives at home with S.O. and 3 yo son. Emergency contact is Friend- Valeriy Tijerina [phone]. Patient reports family/friend can transport at [place]. Patient reports he was Ind with ADLs prior to admission. Patient denies h/o HH/SNF and has no DME. Patient uses [facility] on [address] and reports no  difficulty affording medications. Patient is agreeable to Meds to Beds. Patient anticipates no DC needs at this time. Based on interdisciplinary assessments the reccomended DC plan is home with family. CCP to follow. Rocco OCASIO RN              Continued Care and Services - Admitted Since 9/13/2022    Coordination has not been started for this encounter.          Demographic Summary     Row Name 09/14/22 1037       General Information    Admission Type observation    Arrived From home    Referral Source admission list    Reason for Consult discharge planning    Preferred Language English               Functional Status     Row Name 09/14/22 1037       Functional Status    Usual Activity Tolerance good    Current Activity Tolerance good       Functional Status, IADL    Medications independent    Meal Preparation independent    Housekeeping independent    Laundry independent    Shopping independent       Mental Status    General Appearance WDL WDL       Mental Status Summary    Recent Changes in Mental Status/Cognitive Functioning no changes               Psychosocial    No documentation.                Abuse/Neglect    No documentation.                Legal    No documentation.                Substance Abuse    No documentation.                Patient Forms    No documentation.                   Rocco Zamudio RN

## 2022-09-15 ENCOUNTER — TELEPHONE (OUTPATIENT)
Dept: SURGERY | Facility: CLINIC | Age: 32
End: 2022-09-15

## 2022-09-15 VITALS
RESPIRATION RATE: 18 BRPM | OXYGEN SATURATION: 99 % | HEART RATE: 72 BPM | TEMPERATURE: 97.6 F | HEIGHT: 70 IN | DIASTOLIC BLOOD PRESSURE: 75 MMHG | WEIGHT: 170 LBS | SYSTOLIC BLOOD PRESSURE: 118 MMHG | BODY MASS INDEX: 24.34 KG/M2

## 2022-09-15 LAB
HCT VFR BLD AUTO: 29 % (ref 37.5–51)
HGB BLD-MCNC: 9.8 G/DL (ref 13–17.7)
LAB AP CASE REPORT: NORMAL
PATH REPORT.FINAL DX SPEC: NORMAL
PATH REPORT.GROSS SPEC: NORMAL

## 2022-09-15 PROCEDURE — G0378 HOSPITAL OBSERVATION PER HR: HCPCS

## 2022-09-15 PROCEDURE — 85014 HEMATOCRIT: CPT | Performed by: HOSPITALIST

## 2022-09-15 PROCEDURE — 85018 HEMOGLOBIN: CPT | Performed by: HOSPITALIST

## 2022-09-15 RX ORDER — PANTOPRAZOLE SODIUM 40 MG/1
40 TABLET, DELAYED RELEASE ORAL DAILY
Qty: 60 TABLET | Refills: 0 | Status: SHIPPED | OUTPATIENT
Start: 2022-09-15 | End: 2022-11-14

## 2022-09-15 RX ORDER — SUCRALFATE 1 G/1
1 TABLET ORAL 4 TIMES DAILY
Qty: 120 TABLET | Refills: 1 | Status: SHIPPED | OUTPATIENT
Start: 2022-09-15 | End: 2022-11-14

## 2022-09-15 RX ADMIN — SUCRALFATE 1 G: 1 TABLET ORAL at 06:46

## 2022-09-15 RX ADMIN — PANTOPRAZOLE SODIUM 40 MG: 40 INJECTION, POWDER, FOR SOLUTION INTRAVENOUS at 06:46

## 2022-09-15 RX ADMIN — Medication 10 ML: at 11:14

## 2022-09-15 RX ADMIN — SUCRALFATE 1 G: 1 TABLET ORAL at 11:13

## 2022-09-15 NOTE — TELEPHONE ENCOUNTER
I called the patient to his cell phone number as well as his contacting formation Mr. Valeriy Novoa.  I was not able to talk with the patient but I was able to talk with his friend.  I left a voicemail on the patient's cell phone.  I specifically told him that since he left AMA no medications were sent to his pharmacy.  I just sent Protonix 40 mg twice daily as well as sucralfate 1 g 4 times daily to his pharmacy so he should start taking it and taking it for 2 months.  Patient should avoid ibuprofen as well as caffeinated beverages.

## 2022-09-15 NOTE — PLAN OF CARE
Goal Outcome Evaluation:         Patient educated on need to stay until seen by MD this shift for discharge.  Chose to leave AMA in order to go outpatient and be seen for tooth pain at ambulatory care.

## 2022-09-15 NOTE — PLAN OF CARE
Problem: Adult Inpatient Plan of Care  Goal: Plan of Care Review  Outcome: Ongoing, Progressing  Flowsheets (Taken 9/15/2022 0445)  Progress: improving  Plan of Care Reviewed With: patient  Outcome Evaluation: VSS. Room air. A&Ox4. Right upper tooth pain. Pt awaiting discharge home.  Goal: Patient-Specific Goal (Individualized)  Outcome: Ongoing, Progressing  Goal: Absence of Hospital-Acquired Illness or Injury  Outcome: Ongoing, Progressing  Intervention: Identify and Manage Fall Risk  Recent Flowsheet Documentation  Taken 9/15/2022 0437 by Jenifer Martin RN  Safety Promotion/Fall Prevention:   assistive device/personal items within reach   activity supervised   clutter free environment maintained   safety round/check completed  Taken 9/15/2022 0257 by Jenifer Martin RN  Safety Promotion/Fall Prevention:   activity supervised   assistive device/personal items within reach   clutter free environment maintained   safety round/check completed  Taken 9/15/2022 0058 by Jenifer Martin RN  Safety Promotion/Fall Prevention:   activity supervised   assistive device/personal items within reach   clutter free environment maintained   safety round/check completed  Taken 9/14/2022 2245 by Jenifer Martin RN  Safety Promotion/Fall Prevention:   activity supervised   assistive device/personal items within reach   safety round/check completed  Taken 9/14/2022 2036 by Jenifer Martin RN  Safety Promotion/Fall Prevention:   activity supervised   clutter free environment maintained   assistive device/personal items within reach   toileting scheduled  Intervention: Prevent Skin Injury  Recent Flowsheet Documentation  Taken 9/14/2022 2036 by Jenifer Martin, RN  Skin Protection:   adhesive use limited   tubing/devices free from skin contact  Intervention: Prevent and Manage VTE (Venous Thromboembolism) Risk  Recent Flowsheet Documentation  Taken 9/14/2022 2036 by Jenifer Martin, RN  Activity Management: up ad kaia  Range of Motion:  active ROM (range of motion) encouraged  Intervention: Prevent Infection  Recent Flowsheet Documentation  Taken 9/15/2022 0437 by Jenifer Martin RN  Infection Prevention:   rest/sleep promoted   hand hygiene promoted  Taken 9/15/2022 0257 by Jenifer Martin RN  Infection Prevention:   hand hygiene promoted   rest/sleep promoted  Taken 9/15/2022 0058 by Jenifer Martin RN  Infection Prevention: hand hygiene promoted  Taken 9/14/2022 2245 by Jenifer Martin RN  Infection Prevention:   single patient room provided   rest/sleep promoted   hand hygiene promoted  Taken 9/14/2022 2036 by Jenifer Martin RN  Infection Prevention:   environmental surveillance performed   single patient room provided   rest/sleep promoted  Goal: Optimal Comfort and Wellbeing  Outcome: Ongoing, Progressing  Intervention: Provide Person-Centered Care  Recent Flowsheet Documentation  Taken 9/14/2022 2036 by Jenifer Martin RN  Trust Relationship/Rapport:   care explained   choices provided   thoughts/feelings acknowledged   questions encouraged  Goal: Readiness for Transition of Care  Outcome: Ongoing, Progressing     Problem: Pain Acute  Goal: Acceptable Pain Control and Functional Ability  Outcome: Ongoing, Progressing  Intervention: Prevent or Manage Pain  Recent Flowsheet Documentation  Taken 9/15/2022 0437 by Jenifer Martin RN  Medication Review/Management: medications reviewed  Taken 9/15/2022 0257 by Jenifer Martin RN  Medication Review/Management: medications reviewed  Taken 9/15/2022 0058 by Jenifer Martin RN  Medication Review/Management: medications reviewed  Taken 9/14/2022 2245 by Jenifer Martin RN  Medication Review/Management: medications reviewed  Taken 9/14/2022 2036 by Jenifer Martin RN  Sensory Stimulation Regulation: lighting decreased  Sleep/Rest Enhancement:   awakenings minimized   consistent schedule promoted  Medication Review/Management: medications reviewed  Intervention: Optimize Psychosocial Wellbeing  Recent  Flowsheet Documentation  Taken 9/14/2022 2036 by Jenifer Martin, RN  Supportive Measures: active listening utilized  Diversional Activities:   television   smartphone   Goal Outcome Evaluation:  Plan of Care Reviewed With: patient        Progress: improving  Outcome Evaluation: VSS. Room air. A&Ox4. Right upper tooth pain. Pt awaiting discharge home.

## 2022-09-16 ENCOUNTER — TELEPHONE (OUTPATIENT)
Dept: SURGERY | Facility: CLINIC | Age: 32
End: 2022-09-16

## 2022-09-16 NOTE — CASE MANAGEMENT/SOCIAL WORK
Case Management Discharge Note      Final Note: Left AMA. Rocco OCASIO RN         Selected Continued Care - Discharged on 9/15/2022 Admission date: 9/13/2022 - Discharge disposition: Left Against Medical Advice    Destination    No services have been selected for the patient.              Durable Medical Equipment    No services have been selected for the patient.              Dialysis/Infusion    No services have been selected for the patient.              Home Medical Care    No services have been selected for the patient.              Therapy    No services have been selected for the patient.              Community Resources    No services have been selected for the patient.              Community & DME    No services have been selected for the patient.                  Transportation Services  Private: Car    Final Discharge Disposition Code: 07 - left AMA

## 2022-09-16 NOTE — TELEPHONE ENCOUNTER
Patient's sister called regarding her brothers medication Sucralfate (carafate) she stated he had his first dose about 30 minutes ago and broke out into a rash all over his chest, I had told her he can take benadryl in the meantime and she stated he is allergic to that. I was able to speak with Dr. Mane here in office and he said to stop the Sucralfate (carafate) and continue taking the Pantoprazole (Protonix) 40 MG for now. Called the girlfriend Maria Guadalupe this time and informed her what Dr. Mane had said to do.

## 2023-02-21 ENCOUNTER — HOSPITAL ENCOUNTER (EMERGENCY)
Facility: HOSPITAL | Age: 33
Discharge: HOME | End: 2023-02-21
Attending: EMERGENCY MEDICINE | Admitting: EMERGENCY MEDICINE

## 2023-02-21 ENCOUNTER — APPOINTMENT (OUTPATIENT)
Dept: CT IMAGING | Facility: HOSPITAL | Age: 33
End: 2023-02-21

## 2023-02-21 VITALS
SYSTOLIC BLOOD PRESSURE: 100 MMHG | RESPIRATION RATE: 16 BRPM | TEMPERATURE: 97.4 F | DIASTOLIC BLOOD PRESSURE: 65 MMHG | OXYGEN SATURATION: 100 % | HEIGHT: 70 IN | BODY MASS INDEX: 24.34 KG/M2 | HEART RATE: 91 BPM | WEIGHT: 170 LBS

## 2023-02-21 DIAGNOSIS — K29.00 ACUTE GASTRITIS WITHOUT HEMORRHAGE, UNSPECIFIED GASTRITIS TYPE: Primary | ICD-10-CM

## 2023-02-21 LAB
ABO GROUP BLD: NORMAL
ALBUMIN SERPL-MCNC: 4 G/DL (ref 3.5–5.2)
ALBUMIN/GLOB SERPL: 1.3 G/DL
ALP SERPL-CCNC: 115 U/L (ref 39–117)
ALT SERPL W P-5'-P-CCNC: 15 U/L (ref 1–41)
ANION GAP SERPL CALCULATED.3IONS-SCNC: 6.4 MMOL/L (ref 5–15)
APTT PPP: 31.7 SECONDS (ref 22.7–35.4)
AST SERPL-CCNC: 23 U/L (ref 1–40)
BASOPHILS # BLD AUTO: 0.01 10*3/MM3 (ref 0–0.2)
BASOPHILS NFR BLD AUTO: 0.1 % (ref 0–1.5)
BILIRUB SERPL-MCNC: 0.5 MG/DL (ref 0–1.2)
BLD GP AB SCN SERPL QL: NEGATIVE
BUN SERPL-MCNC: 14 MG/DL (ref 6–20)
BUN/CREAT SERPL: 15.4 (ref 7–25)
CALCIUM SPEC-SCNC: 8.9 MG/DL (ref 8.6–10.5)
CHLORIDE SERPL-SCNC: 97 MMOL/L (ref 98–107)
CO2 SERPL-SCNC: 30.6 MMOL/L (ref 22–29)
CREAT SERPL-MCNC: 0.91 MG/DL (ref 0.76–1.27)
DEPRECATED RDW RBC AUTO: 44.4 FL (ref 37–54)
EGFRCR SERPLBLD CKD-EPI 2021: 113.4 ML/MIN/1.73
EOSINOPHIL # BLD AUTO: 0.03 10*3/MM3 (ref 0–0.4)
EOSINOPHIL NFR BLD AUTO: 0.3 % (ref 0.3–6.2)
ERYTHROCYTE [DISTWIDTH] IN BLOOD BY AUTOMATED COUNT: 15.1 % (ref 12.3–15.4)
GLOBULIN UR ELPH-MCNC: 3.2 GM/DL
GLUCOSE SERPL-MCNC: 94 MG/DL (ref 65–99)
HCT VFR BLD AUTO: 43.7 % (ref 37.5–51)
HGB BLD-MCNC: 14.6 G/DL (ref 13–17.7)
IMM GRANULOCYTES # BLD AUTO: 0.04 10*3/MM3 (ref 0–0.05)
IMM GRANULOCYTES NFR BLD AUTO: 0.5 % (ref 0–0.5)
INR PPP: 0.93 (ref 0.9–1.1)
LIPASE SERPL-CCNC: 25 U/L (ref 13–60)
LYMPHOCYTES # BLD AUTO: 0.97 10*3/MM3 (ref 0.7–3.1)
LYMPHOCYTES NFR BLD AUTO: 11.2 % (ref 19.6–45.3)
MCH RBC QN AUTO: 27.6 PG (ref 26.6–33)
MCHC RBC AUTO-ENTMCNC: 33.4 G/DL (ref 31.5–35.7)
MCV RBC AUTO: 82.6 FL (ref 79–97)
MONOCYTES # BLD AUTO: 0.53 10*3/MM3 (ref 0.1–0.9)
MONOCYTES NFR BLD AUTO: 6.1 % (ref 5–12)
NEUTROPHILS NFR BLD AUTO: 7.09 10*3/MM3 (ref 1.7–7)
NEUTROPHILS NFR BLD AUTO: 81.8 % (ref 42.7–76)
NRBC BLD AUTO-RTO: 0 /100 WBC (ref 0–0.2)
PLATELET # BLD AUTO: 303 10*3/MM3 (ref 140–450)
PMV BLD AUTO: 10.5 FL (ref 6–12)
POTASSIUM SERPL-SCNC: 3.4 MMOL/L (ref 3.5–5.2)
PROT SERPL-MCNC: 7.2 G/DL (ref 6–8.5)
PROTHROMBIN TIME: 12.6 SECONDS (ref 11.7–14.2)
RBC # BLD AUTO: 5.29 10*6/MM3 (ref 4.14–5.8)
RH BLD: POSITIVE
SODIUM SERPL-SCNC: 134 MMOL/L (ref 136–145)
T&S EXPIRATION DATE: NORMAL
WBC NRBC COR # BLD: 8.67 10*3/MM3 (ref 3.4–10.8)

## 2023-02-21 PROCEDURE — 85610 PROTHROMBIN TIME: CPT | Performed by: PHYSICIAN ASSISTANT

## 2023-02-21 PROCEDURE — 86850 RBC ANTIBODY SCREEN: CPT | Performed by: PHYSICIAN ASSISTANT

## 2023-02-21 PROCEDURE — 86901 BLOOD TYPING SEROLOGIC RH(D): CPT | Performed by: PHYSICIAN ASSISTANT

## 2023-02-21 PROCEDURE — 70450 CT HEAD/BRAIN W/O DYE: CPT

## 2023-02-21 PROCEDURE — 83690 ASSAY OF LIPASE: CPT | Performed by: PHYSICIAN ASSISTANT

## 2023-02-21 PROCEDURE — 96375 TX/PRO/DX INJ NEW DRUG ADDON: CPT

## 2023-02-21 PROCEDURE — 25010000002 IOPAMIDOL 61 % SOLUTION: Performed by: EMERGENCY MEDICINE

## 2023-02-21 PROCEDURE — 85730 THROMBOPLASTIN TIME PARTIAL: CPT | Performed by: PHYSICIAN ASSISTANT

## 2023-02-21 PROCEDURE — 80053 COMPREHEN METABOLIC PANEL: CPT | Performed by: PHYSICIAN ASSISTANT

## 2023-02-21 PROCEDURE — 99283 EMERGENCY DEPT VISIT LOW MDM: CPT

## 2023-02-21 PROCEDURE — 74177 CT ABD & PELVIS W/CONTRAST: CPT

## 2023-02-21 PROCEDURE — 96374 THER/PROPH/DIAG INJ IV PUSH: CPT

## 2023-02-21 PROCEDURE — 85025 COMPLETE CBC W/AUTO DIFF WBC: CPT | Performed by: PHYSICIAN ASSISTANT

## 2023-02-21 PROCEDURE — 25010000002 PROCHLORPERAZINE 10 MG/2ML SOLUTION: Performed by: PHYSICIAN ASSISTANT

## 2023-02-21 PROCEDURE — 86900 BLOOD TYPING SEROLOGIC ABO: CPT | Performed by: PHYSICIAN ASSISTANT

## 2023-02-21 RX ORDER — ONDANSETRON 4 MG/1
4 TABLET, FILM COATED ORAL EVERY 6 HOURS PRN
Qty: 12 TABLET | Refills: 0 | Status: SHIPPED | OUTPATIENT
Start: 2023-02-21

## 2023-02-21 RX ORDER — PANTOPRAZOLE SODIUM 40 MG/1
40 TABLET, DELAYED RELEASE ORAL DAILY
Qty: 30 TABLET | Refills: 0 | Status: SHIPPED | OUTPATIENT
Start: 2023-02-21

## 2023-02-21 RX ORDER — PROCHLORPERAZINE EDISYLATE 5 MG/ML
10 INJECTION INTRAMUSCULAR; INTRAVENOUS ONCE
Status: COMPLETED | OUTPATIENT
Start: 2023-02-21 | End: 2023-02-21

## 2023-02-21 RX ORDER — PANTOPRAZOLE SODIUM 40 MG/10ML
80 INJECTION, POWDER, LYOPHILIZED, FOR SOLUTION INTRAVENOUS ONCE
Status: COMPLETED | OUTPATIENT
Start: 2023-02-21 | End: 2023-02-21

## 2023-02-21 RX ADMIN — SODIUM CHLORIDE 1000 ML: 9 INJECTION, SOLUTION INTRAVENOUS at 19:11

## 2023-02-21 RX ADMIN — PANTOPRAZOLE SODIUM 80 MG: 40 INJECTION, POWDER, FOR SOLUTION INTRAVENOUS at 20:20

## 2023-02-21 RX ADMIN — PROCHLORPERAZINE EDISYLATE 10 MG: 5 INJECTION INTRAMUSCULAR; INTRAVENOUS at 19:14

## 2023-02-21 RX ADMIN — IOPAMIDOL 85 ML: 612 INJECTION, SOLUTION INTRAVENOUS at 20:11

## 2023-02-21 NOTE — ED PROVIDER NOTES
EMERGENCY DEPARTMENT ENCOUNTER    Room Number:  06/06  Date of encounter:  2/21/2023  PCP: Provider, No Known  Patient Care Team:  Provider, No Known as PCP - General   Independent Historians: Patient    HPI:  Chief Complaint: Black or bloody stool  A complete HPI/ROS/PMH/PSH/SH/FH are unobtainable due to: None    Chronic or social conditions impacting patient care (social determinants of health): None    Context: Adam Odom is a 32 y.o. male who presents to the ED c/o upper abdominal pain, black stool as well as a headache.  Patient reports headache began last night after vomiting.  He reports symptoms of abdominal pain have been over the past several weeks but became more severe today.  Patient has history of ulcer and previously was on a PPI but stopped taking this when the prescription ran out.  He says it is probably been several months since he has taken any of this medication.  He has not had any hematemesis.  He has not followed up with GI or general surgery since he had his upper scope in September 2022.  He denies frequent alcohol use, NSAID use.    Review of prior external notes (non-ED): Reviewed upper endoscopy op note from Dr. Mane on 9/13/2022.  Patient had cratered duodenal ulcer which was treated with injection of solution of epinephrine for hemostasis.  Biopsies were taken with cold forceps.    Review of prior external test results outside of this encounter: CBC from 9/13/2022 shows hemoglobin of 10.6, platelets 260.  CMP from 9/13/2022 shows BUN of 19, normal renal function.    PAST MEDICAL HISTORY  Active Ambulatory Problems     Diagnosis Date Noted   • Abdominal pain 09/09/2022   • Peptic ulcer 09/13/2022   • Duodenal ulcer 09/13/2022     Resolved Ambulatory Problems     Diagnosis Date Noted   • Gastrointestinal hemorrhage associated with gastric ulcer 09/13/2022     No Additional Past Medical History       The patient qualifies to receive the vaccine, but they have not yet received  it.    PAST SURGICAL HISTORY  Past Surgical History:   Procedure Laterality Date   • ENDOSCOPY N/A 2022    Procedure: ESOPHAGOGASTRODUODENOSCOPY with bx and epi injection;  Surgeon: Malick Mane MD;  Location: [facility] ENDOSCOPY;  Service: General;  Laterality: N/A;  pre- anemia, gi bleed   post-  duodenal ulcer          FAMILY HISTORY  History reviewed. No pertinent family history.      SOCIAL HISTORY  Social History     Socioeconomic History   • Marital status: Single   Tobacco Use   • Smoking status: Former     Types: Electronic Cigarette     Quit date: 2017     Years since quittin.4   • Smokeless tobacco: Never   Substance and Sexual Activity   • Alcohol use: Yes     Comment: socially   • Sexual activity: Yes     Partners: Female         ALLERGIES  Benadryl [diphenhydramine hcl (sleep)]        REVIEW OF SYSTEMS  Review of Systems   Constitutional: Negative for chills and fever.   Respiratory: Negative for shortness of breath.    Cardiovascular: Negative for chest pain.   Gastrointestinal: Positive for abdominal pain and blood in stool. Negative for vomiting.   Neurological: Positive for headaches. Negative for light-headedness.        All systems reviewed and negative except for those discussed in HPI.       PHYSICAL EXAM    I have reviewed the triage vital signs and nursing notes.    ED Triage Vitals   Temp Heart Rate Resp BP SpO2   23 1726 23 1726 23 1736 23 1813 23 1726   97.4 °F (36.3 °C) 100 16 119/79 95 %      Temp src Heart Rate Source Patient Position BP Location FiO2 (%)   23 1726 23 1726 -- -- --   Tympanic Monitor          Physical Exam  GENERAL: alert, appears uncomfortable but nontoxic  SKIN: Warm, dry  HENT: Normocephalic, atraumatic  EYES: no scleral icterus  CV: regular rhythm, regular rate  RESPIRATORY: normal effort, lungs clear  ABDOMEN: soft, upper abdominal pain worse in the epigastric region, nondistended  RECTAL: Normal  external exam, nontender, light brown stool, heme-negative  MUSCULOSKELETAL: no deformity  NEURO: alert, moves all extremities, follows commands, no focal neurological deficits          LAB RESULTS  Recent Results (from the past 24 hour(s))   Comprehensive Metabolic Panel    Collection Time: 02/21/23  7:07 PM    Specimen: Blood   Result Value Ref Range    Glucose 94 65 - 99 mg/dL    BUN 14 6 - 20 mg/dL    Creatinine 0.91 0.76 - 1.27 mg/dL    Sodium 134 (L) 136 - 145 mmol/L    Potassium 3.4 (L) 3.5 - 5.2 mmol/L    Chloride 97 (L) 98 - 107 mmol/L    CO2 30.6 (H) 22.0 - 29.0 mmol/L    Calcium 8.9 8.6 - 10.5 mg/dL    Total Protein 7.2 6.0 - 8.5 g/dL    Albumin 4.0 3.5 - 5.2 g/dL    ALT (SGPT) 15 1 - 41 U/L    AST (SGOT) 23 1 - 40 U/L    Alkaline Phosphatase 115 39 - 117 U/L    Total Bilirubin 0.5 0.0 - 1.2 mg/dL    Globulin 3.2 gm/dL    A/G Ratio 1.3 g/dL    BUN/Creatinine Ratio 15.4 7.0 - 25.0    Anion Gap 6.4 5.0 - 15.0 mmol/L    eGFR 113.4 >60.0 mL/min/1.73   Lipase    Collection Time: 02/21/23  7:07 PM    Specimen: Blood   Result Value Ref Range    Lipase 25 13 - 60 U/L   Protime-INR    Collection Time: 02/21/23  7:07 PM    Specimen: Blood   Result Value Ref Range    Protime 12.6 11.7 - 14.2 Seconds    INR 0.93 0.90 - 1.10   aPTT    Collection Time: 02/21/23  7:07 PM    Specimen: Blood   Result Value Ref Range    PTT 31.7 22.7 - 35.4 seconds   Type & Screen    Collection Time: 02/21/23  7:07 PM    Specimen: Blood   Result Value Ref Range    ABO Type B     RH type Positive     Antibody Screen Negative     T&S Expiration Date 2/24/2023 11:59:59 PM    CBC Auto Differential    Collection Time: 02/21/23  7:07 PM    Specimen: Blood   Result Value Ref Range    WBC 8.67 3.40 - 10.80 10*3/mm3    RBC 5.29 4.14 - 5.80 10*6/mm3    Hemoglobin 14.6 13.0 - 17.7 g/dL    Hematocrit 43.7 37.5 - 51.0 %    MCV 82.6 79.0 - 97.0 fL    MCH 27.6 26.6 - 33.0 pg    MCHC 33.4 31.5 - 35.7 g/dL    RDW 15.1 12.3 - 15.4 %    RDW-SD 44.4 37.0 -  54.0 fl    MPV 10.5 6.0 - 12.0 fL    Platelets 303 140 - 450 10*3/mm3    Neutrophil % 81.8 (H) 42.7 - 76.0 %    Lymphocyte % 11.2 (L) 19.6 - 45.3 %    Monocyte % 6.1 5.0 - 12.0 %    Eosinophil % 0.3 0.3 - 6.2 %    Basophil % 0.1 0.0 - 1.5 %    Immature Grans % 0.5 0.0 - 0.5 %    Neutrophils, Absolute 7.09 (H) 1.70 - 7.00 10*3/mm3    Lymphocytes, Absolute 0.97 0.70 - 3.10 10*3/mm3    Monocytes, Absolute 0.53 0.10 - 0.90 10*3/mm3    Eosinophils, Absolute 0.03 0.00 - 0.40 10*3/mm3    Basophils, Absolute 0.01 0.00 - 0.20 10*3/mm3    Immature Grans, Absolute 0.04 0.00 - 0.05 10*3/mm3    nRBC 0.0 0.0 - 0.2 /100 WBC       Ordered the above labs and independently reviewed and interpreted the results.        RADIOLOGY  CT Head Without Contrast    Result Date: 2/21/2023  CT HEAD WO CONTRAST-  INDICATIONS: Headache  TECHNIQUE: Radiation dose reduction techniques were utilized, including automated exposure control and exposure modulation based on body size. Noncontrast head CT  COMPARISON: None available  FINDINGS:    No acute intracranial hemorrhage, midline shift or mass effect. No acute territorial infarct is identified.  Ventricles, cisterns, cerebral sulci are unremarkable for patient age.  The visualized paranasal sinuses, orbits, mastoid air cells are unremarkable.       No acute intracranial hemorrhage or hydrocephalus. If there is further clinical concern, MRI could be considered for further evaluation.  This report was finalized on 2/21/2023 8:25 PM by Dr. Dayton Kendall M.D.      CT Abdomen Pelvis With Contrast    Result Date: 2/21/2023  CT ABDOMEN AND PELVIS WITH IV CONTRAST  HISTORY: Upper abdominal pain, melena  TECHNIQUE: Radiation dose reduction techniques were utilized, including automated exposure control and exposure modulation based on body size. Axial images were obtained through the abdomen and pelvis after the administration of IV contrast. Coronal and sagittal reformatted images obtained.   COMPARISON: 09/13/2022  FINDINGS:  ABDOMEN: The lung bases are clear. The liver, gallbladder, and spleen are unremarkable. The kidneys, adrenal glands, and pancreas are unremarkable. No evidence of bowel obstruction.  PELVIS: The bladder is unremarkable. No free fluid is seen in the pelvis. Colon is unremarkable. The appendix is normal. No acute bony abnormality      No acute intra-abdominal or pelvic abnormality  Radiation dose reduction techniques were utilized, including automated exposure control and exposure modulation based on body size.  This report was finalized on 2/21/2023 8:27 PM by Dr. Pete Francois M.D.        I ordered the above noted radiological studies. Independently reviewed and interpreted by me.  See dictation for official radiology interpretation.      PROCEDURES    Procedures      MEDICATIONS GIVEN IN ER    Medications   sodium chloride 0.9 % bolus 1,000 mL (0 mL Intravenous Stopped 2/21/23 2004)   prochlorperazine (COMPAZINE) injection 10 mg (10 mg Intravenous Given 2/21/23 1914)   pantoprazole (PROTONIX) injection 80 mg (80 mg Intravenous Given 2/21/23 2020)   iopamidol (ISOVUE-300) 61 % injection 85 mL (85 mL Intravenous Given 2/21/23 2011)         PROGRESS, DATA ANALYSIS, CONSULTS, AND MEDICAL DECISION MAKING    All labs have been independently reviewed and interpreted by me.  All radiology studies have been independently reviewed and interpreted by me and discussed with radiologist dictating the report.   EKG's independently reviewed and interpreted by me.  Discussion below represents my analysis of pertinent findings related to patient's condition, differential diagnosis, treatment plan and final disposition.    Differential diagnosis: gastritis, GI ulcer, colitis    ED Course as of 02/22/23 1849 Tue Feb 21, 2023 1944 WBC: 8.67 [DC]   1944 Hemoglobin: 14.6  Improved from 9.8 five months ago [DC]   2003 BUN: 14 [DC]   2003 Creatinine: 0.91 [DC]   2003 Sodium(!): 134 [DC]   2003  Potassium(!): 3.4 [DC]   2019 CT Head Without Contrast  My independent interpretation of the CT of the head is no acute hemorrhage [TR]   2019 CT Abdomen Pelvis With Contrast  My independent interpretation of the CT of the abdomen pelvis is no free air.  No obstruction [TR]   2112 Discussed lab and imaging results with patient and significant other at bedside.  We will restart patient on Protonix that he was previously prescribed by general surgery.  Will provide follow-up for PCP and Dr. Mane, general surgery.  Patient's hemoglobin is within normal limits and significantly improved from previous admission for bleeding ulcer and do not feel patient needs to be admitted at this time and is safe for outpatient follow-up. [DC]      ED Course User Index  [DC] Gina Palomino PA  [TR] Soy Johnson MD           PPE: Patient was placed in face mask in first look. Patient was wearing facemask when I entered the room and throughout our encounter. I wore full protective equipment throughout this patient encounter including a face mask, and gloves. Hand hygiene was performed before donning protective equipment and after removal when leaving the room.        AS OF 18:49 EST VITALS:    BP - 100/65  HR - 91  TEMP - 97.4 °F (36.3 °C) (Tympanic)  O2 SATS - 100%        DIAGNOSIS  Final diagnoses:   Acute gastritis without hemorrhage, unspecified gastritis type         DISPOSITION  ED Disposition     ED Disposition   Discharge    Condition   Stable    Comment   --                Note Disclaimer: At [facility], we believe that sharing information builds trust and better relationships. You are receiving this note because you recently visited [facility]. It is possible you will see health information before a provider has talked with you about it. This kind of information can be easy to misunderstand. To help you fully understand what it means for your health, we urge you to discuss this note with your provider.       Georgette  VINAY Fuentes  02/22/23 2003

## 2023-02-21 NOTE — ED TRIAGE NOTES
Patient to ER via car from home for black stools states he was here for same recently  Patient wearing mask this Rn in PPE

## 2023-02-21 NOTE — ED NOTES
Pt states he is having generalized abd pain, black stool, N/V, and a headache. Pt states he has had these symptoms for the past few months but the pain in his abd has gotten worse over the last few weeks. Pt denies any use of blood thinners. Pt states he has a hx of GI ulcer with these same symptoms.

## 2023-02-22 NOTE — ED PROVIDER NOTES
MD ATTESTATION NOTE    The TAR[facility] and I have discussed this patient's history, physical exam, and treatment plan.  I have reviewed the documentation and personally had a face to face interaction with the patient. I affirm the documentation and agree with the treatment and plan.  The attached note describes my personal findings.    I provided a substantive portion of the care of this patient. I personally performed the physical exam, in its entirety.    Independent Historians: Patient    A complete HPI/ROS/PMH/PSH/SH/FH are unobtainable due to: Nothing    Chronic or social conditions impacting patient care (social determinants of health): None    Adam Odom is a 32 y.o. male who presents to the ED c/o dark stools.  The patient reports he has had upper abdominal pain as well as dark stools and a headache.  He states that he has a history of a GI ulcer.  He states that the symptoms have been worsening over the last several weeks.  He reports they are severe today.  The pain does not radiate.  Nothing makes it worse or better.      On exam:  GENERAL: Awake, alert, no acute distress  SKIN: Warm, dry  HENT: Normocephalic, atraumatic  EYES: no scleral icterus  CV: regular rhythm, regular rate  RESPIRATORY: normal effort, lungs clear  ABDOMEN: soft, mild to moderate epigastric tenderness, nondistended  MUSCULOSKELETAL: no deformity, no meningismus  NEURO: alert, moves all extremities, follows commands    Labs  Recent Results (from the past 24 hour(s))   Comprehensive Metabolic Panel    Collection Time: 02/21/23  7:07 PM    Specimen: Blood   Result Value Ref Range    Glucose 94 65 - 99 mg/dL    BUN 14 6 - 20 mg/dL    Creatinine 0.91 0.76 - 1.27 mg/dL    Sodium 134 (L) 136 - 145 mmol/L    Potassium 3.4 (L) 3.5 - 5.2 mmol/L    Chloride 97 (L) 98 - 107 mmol/L    CO2 30.6 (H) 22.0 - 29.0 mmol/L    Calcium 8.9 8.6 - 10.5 mg/dL    Total Protein 7.2 6.0 - 8.5 g/dL    Albumin 4.0 3.5 - 5.2 g/dL    ALT (SGPT) 15 1 - 41 U/L    AST  (SGOT) 23 1 - 40 U/L    Alkaline Phosphatase 115 39 - 117 U/L    Total Bilirubin 0.5 0.0 - 1.2 mg/dL    Globulin 3.2 gm/dL    A/G Ratio 1.3 g/dL    BUN/Creatinine Ratio 15.4 7.0 - 25.0    Anion Gap 6.4 5.0 - 15.0 mmol/L    eGFR 113.4 >60.0 mL/min/1.73   Lipase    Collection Time: 02/21/23  7:07 PM    Specimen: Blood   Result Value Ref Range    Lipase 25 13 - 60 U/L   Protime-INR    Collection Time: 02/21/23  7:07 PM    Specimen: Blood   Result Value Ref Range    Protime 12.6 11.7 - 14.2 Seconds    INR 0.93 0.90 - 1.10   aPTT    Collection Time: 02/21/23  7:07 PM    Specimen: Blood   Result Value Ref Range    PTT 31.7 22.7 - 35.4 seconds   Type & Screen    Collection Time: 02/21/23  7:07 PM    Specimen: Blood   Result Value Ref Range    ABO Type B     RH type Positive     Antibody Screen Negative     T&S Expiration Date 2/24/2023 11:59:59 PM    CBC Auto Differential    Collection Time: 02/21/23  7:07 PM    Specimen: Blood   Result Value Ref Range    WBC 8.67 3.40 - 10.80 10*3/mm3    RBC 5.29 4.14 - 5.80 10*6/mm3    Hemoglobin 14.6 13.0 - 17.7 g/dL    Hematocrit 43.7 37.5 - 51.0 %    MCV 82.6 79.0 - 97.0 fL    MCH 27.6 26.6 - 33.0 pg    MCHC 33.4 31.5 - 35.7 g/dL    RDW 15.1 12.3 - 15.4 %    RDW-SD 44.4 37.0 - 54.0 fl    MPV 10.5 6.0 - 12.0 fL    Platelets 303 140 - 450 10*3/mm3    Neutrophil % 81.8 (H) 42.7 - 76.0 %    Lymphocyte % 11.2 (L) 19.6 - 45.3 %    Monocyte % 6.1 5.0 - 12.0 %    Eosinophil % 0.3 0.3 - 6.2 %    Basophil % 0.1 0.0 - 1.5 %    Immature Grans % 0.5 0.0 - 0.5 %    Neutrophils, Absolute 7.09 (H) 1.70 - 7.00 10*3/mm3    Lymphocytes, Absolute 0.97 0.70 - 3.10 10*3/mm3    Monocytes, Absolute 0.53 0.10 - 0.90 10*3/mm3    Eosinophils, Absolute 0.03 0.00 - 0.40 10*3/mm3    Basophils, Absolute 0.01 0.00 - 0.20 10*3/mm3    Immature Grans, Absolute 0.04 0.00 - 0.05 10*3/mm3    nRBC 0.0 0.0 - 0.2 /100 WBC       Radiology  CT Head Without Contrast    Result Date: 2/21/2023  CT HEAD WO CONTRAST-  INDICATIONS:  Headache  TECHNIQUE: Radiation dose reduction techniques were utilized, including automated exposure control and exposure modulation based on body size. Noncontrast head CT  COMPARISON: None available  FINDINGS:    No acute intracranial hemorrhage, midline shift or mass effect. No acute territorial infarct is identified.  Ventricles, cisterns, cerebral sulci are unremarkable for patient age.  The visualized paranasal sinuses, orbits, mastoid air cells are unremarkable.       No acute intracranial hemorrhage or hydrocephalus. If there is further clinical concern, MRI could be considered for further evaluation.  This report was finalized on 2/21/2023 8:25 PM by Dr. Dayton Kendall M.D.      CT Abdomen Pelvis With Contrast    Result Date: 2/21/2023  CT ABDOMEN AND PELVIS WITH IV CONTRAST  HISTORY: Upper abdominal pain, melena  TECHNIQUE: Radiation dose reduction techniques were utilized, including automated exposure control and exposure modulation based on body size. Axial images were obtained through the abdomen and pelvis after the administration of IV contrast. Coronal and sagittal reformatted images obtained.  COMPARISON: 09/13/2022  FINDINGS:  ABDOMEN: The lung bases are clear. The liver, gallbladder, and spleen are unremarkable. The kidneys, adrenal glands, and pancreas are unremarkable. No evidence of bowel obstruction.  PELVIS: The bladder is unremarkable. No free fluid is seen in the pelvis. Colon is unremarkable. The appendix is normal. No acute bony abnormality      No acute intra-abdominal or pelvic abnormality  Radiation dose reduction techniques were utilized, including automated exposure control and exposure modulation based on body size.  This report was finalized on 2/21/2023 8:27 PM by Dr. Pete Francois M.D.        Medical Decision Making:  ED Course as of 02/22/23 0133   Tue Feb 21, 2023 1944 WBC: 8.67 [DC]   1944 Hemoglobin: 14.6  Improved from 9.8 five months ago [DC]   2003 BUN: 14 [DC]    2003 Creatinine: 0.91 [DC]   2003 Sodium(!): 134 [DC]   2003 Potassium(!): 3.4 [DC]   2019 CT Head Without Contrast  My independent interpretation of the CT of the head is no acute hemorrhage [TR]   2019 CT Abdomen Pelvis With Contrast  My independent interpretation of the CT of the abdomen pelvis is no free air.  No obstruction [TR]   2112 Discussed lab and imaging results with patient and significant other at bedside.  We will restart patient on Protonix that he was previously prescribed by general surgery.  Will provide follow-up for PCP and Dr. Mane, general surgery.  Patient's hemoglobin is within normal limits and significantly improved from previous admission for bleeding ulcer and do not feel patient needs to be admitted at this time and is safe for outpatient follow-up. [DC]      ED Course User Index  [DC] Gina Palomino PA  [TR] Soy Johnson MD       Given the patient's worsening abdominal pain as well as acute headache we will CT his head as well as abdomen and pelvis.  We will obtain chemistries, blood counts, lipase given the source of his pain.  We will rule out pancreatitis, anemia, GI bleed, intracranial hemorrhage, and others.    Procedures:  Procedures      PPE: The patient wore a mask and I wore an N95 mask throughout the entire patient encounter.      The patient qualifies to receive the vaccine, but they have not yet received it.    Diagnosis  Final diagnoses:   Acute gastritis without hemorrhage, unspecified gastritis type       Note Disclaimer: At [facility], we believe that sharing information builds trust and better relationships. You are receiving this note because you recently visited [facility]. It is possible you will see health information before a provider has talked with you about it. This kind of information can be easy to misunderstand. To help you fully understand what it means for your health, we urge you to discuss this note with your provider.     Soy Johnson  MD [facility]  02/22/23 0133

## (undated) DEVICE — FRCP BX RADJAW4 NDL 2.8 240CM LG OG BX40

## (undated) DEVICE — BITEBLOCK OMNI BLOC

## (undated) DEVICE — LN SMPL CO2 SHTRM SD STREAM W/M LUER

## (undated) DEVICE — TUBING, SUCTION, 1/4" X 10', STRAIGHT: Brand: MEDLINE

## (undated) DEVICE — ADAPT CLN BIOGUARD AIR/H2O DISP

## (undated) DEVICE — KT ORCA ORCAPOD DISP STRL

## (undated) DEVICE — SENSR O2 OXIMAX FNGR A/ 18IN NONSTR

## (undated) DEVICE — CANN O2 ETCO2 FITS ALL CONN CO2 SMPL A/ 7IN DISP LF

## (undated) DEVICE — THE CARR-LOCKE INJECTION NEEDLE IS A SINGLE USE, DISPOSABLE, FLEXIBLE SHEATH INJECTION NEEDLE USED FOR THE INJECTION OF VARIOUS TYPES OF MEDIA THROUGH FLEXIBLE ENDOSCOPES.